# Patient Record
Sex: MALE | Race: WHITE | NOT HISPANIC OR LATINO | Employment: UNEMPLOYED | URBAN - NONMETROPOLITAN AREA
[De-identification: names, ages, dates, MRNs, and addresses within clinical notes are randomized per-mention and may not be internally consistent; named-entity substitution may affect disease eponyms.]

---

## 2017-01-23 ENCOUNTER — HOSPITAL ENCOUNTER (EMERGENCY)
Facility: HOSPITAL | Age: 67
Discharge: HOME | End: 2017-01-23
Attending: EMERGENCY MEDICINE | Admitting: EMERGENCY MEDICINE

## 2017-01-23 VITALS
BODY MASS INDEX: 35.21 KG/M2 | HEIGHT: 72 IN | HEART RATE: 98 BPM | SYSTOLIC BLOOD PRESSURE: 145 MMHG | RESPIRATION RATE: 22 BRPM | WEIGHT: 260 LBS | OXYGEN SATURATION: 94 % | DIASTOLIC BLOOD PRESSURE: 72 MMHG | TEMPERATURE: 98.4 F

## 2017-01-23 DIAGNOSIS — T78.40XA ALLERGIC REACTION, INITIAL ENCOUNTER: Primary | ICD-10-CM

## 2017-01-23 LAB
ALBUMIN SERPL-MCNC: 4.1 G/DL (ref 3.4–4.8)
ALBUMIN/GLOB SERPL: 1.2 G/DL (ref 1.1–1.8)
ALP SERPL-CCNC: 79 U/L (ref 38–126)
ALT SERPL W P-5'-P-CCNC: 108 U/L (ref 21–72)
ANION GAP SERPL CALCULATED.3IONS-SCNC: 16 MMOL/L (ref 5–15)
AST SERPL-CCNC: 70 U/L (ref 17–59)
BASOPHILS # BLD AUTO: 0.02 10*3/MM3 (ref 0–0.2)
BASOPHILS NFR BLD AUTO: 0.2 % (ref 0–2)
BILIRUB SERPL-MCNC: 0.8 MG/DL (ref 0.2–1.3)
BUN BLD-MCNC: 17 MG/DL (ref 7–21)
BUN/CREAT SERPL: 13.7 (ref 7–25)
CALCIUM SPEC-SCNC: 9.2 MG/DL (ref 8.4–10.2)
CHLORIDE SERPL-SCNC: 105 MMOL/L (ref 95–110)
CO2 SERPL-SCNC: 23 MMOL/L (ref 22–31)
CREAT BLD-MCNC: 1.24 MG/DL (ref 0.7–1.3)
DEPRECATED RDW RBC AUTO: 42.8 FL (ref 35.1–43.9)
EOSINOPHIL # BLD AUTO: 0.11 10*3/MM3 (ref 0–0.7)
EOSINOPHIL NFR BLD AUTO: 1.2 % (ref 0–7)
ERYTHROCYTE [DISTWIDTH] IN BLOOD BY AUTOMATED COUNT: 13.1 % (ref 11.5–14.5)
GFR SERPL CREATININE-BSD FRML MDRD: 59 ML/MIN/1.73 (ref 60–113)
GLOBULIN UR ELPH-MCNC: 3.4 GM/DL (ref 2.3–3.5)
GLUCOSE BLD-MCNC: 146 MG/DL (ref 60–100)
HCT VFR BLD AUTO: 45.1 % (ref 39–49)
HGB BLD-MCNC: 15.4 G/DL (ref 13.7–17.3)
IMM GRANULOCYTES # BLD: 0.01 10*3/MM3 (ref 0–0.02)
IMM GRANULOCYTES NFR BLD: 0.1 % (ref 0–0.5)
LYMPHOCYTES # BLD AUTO: 1.11 10*3/MM3 (ref 0.6–4.2)
LYMPHOCYTES NFR BLD AUTO: 12.6 % (ref 10–50)
MCH RBC QN AUTO: 30.6 PG (ref 26.5–34)
MCHC RBC AUTO-ENTMCNC: 34.1 G/DL (ref 31.5–36.3)
MCV RBC AUTO: 89.5 FL (ref 80–98)
MONOCYTES # BLD AUTO: 0.45 10*3/MM3 (ref 0–0.9)
MONOCYTES NFR BLD AUTO: 5.1 % (ref 0–12)
NEUTROPHILS # BLD AUTO: 7.12 10*3/MM3 (ref 2–8.6)
NEUTROPHILS NFR BLD AUTO: 80.8 % (ref 37–80)
PLATELET # BLD AUTO: 205 10*3/MM3 (ref 150–450)
PMV BLD AUTO: 11.3 FL (ref 8–12)
POTASSIUM BLD-SCNC: 3.8 MMOL/L (ref 3.5–5.1)
PROT SERPL-MCNC: 7.5 G/DL (ref 6.3–8.6)
RBC # BLD AUTO: 5.04 10*6/MM3 (ref 4.37–5.74)
SODIUM BLD-SCNC: 144 MMOL/L (ref 137–145)
WBC NRBC COR # BLD: 8.82 10*3/MM3 (ref 3.2–9.8)

## 2017-01-23 PROCEDURE — 25010000002 DIPHENHYDRAMINE PER 50 MG: Performed by: EMERGENCY MEDICINE

## 2017-01-23 PROCEDURE — 80053 COMPREHEN METABOLIC PANEL: CPT | Performed by: EMERGENCY MEDICINE

## 2017-01-23 PROCEDURE — 25010000002 METHYLPREDNISOLONE PER 125 MG: Performed by: EMERGENCY MEDICINE

## 2017-01-23 PROCEDURE — 99283 EMERGENCY DEPT VISIT LOW MDM: CPT

## 2017-01-23 PROCEDURE — 36415 COLL VENOUS BLD VENIPUNCTURE: CPT

## 2017-01-23 PROCEDURE — 96375 TX/PRO/DX INJ NEW DRUG ADDON: CPT

## 2017-01-23 PROCEDURE — 94640 AIRWAY INHALATION TREATMENT: CPT

## 2017-01-23 PROCEDURE — 85025 COMPLETE CBC W/AUTO DIFF WBC: CPT | Performed by: EMERGENCY MEDICINE

## 2017-01-23 PROCEDURE — 96374 THER/PROPH/DIAG INJ IV PUSH: CPT

## 2017-01-23 RX ORDER — AMLODIPINE BESYLATE AND BENAZEPRIL HYDROCHLORIDE 10; 40 MG/1; MG/1
1 CAPSULE ORAL
COMMUNITY
Start: 2017-01-05 | End: 2018-01-06

## 2017-01-23 RX ORDER — ALBUTEROL SULFATE 2.5 MG/3ML
5 SOLUTION RESPIRATORY (INHALATION) ONCE
Status: COMPLETED | OUTPATIENT
Start: 2017-01-23 | End: 2017-01-23

## 2017-01-23 RX ORDER — FAMOTIDINE 20 MG/1
20 TABLET, FILM COATED ORAL 2 TIMES DAILY
Qty: 4 TABLET | Refills: 0 | Status: SHIPPED | OUTPATIENT
Start: 2017-01-23 | End: 2022-09-27

## 2017-01-23 RX ORDER — FAMOTIDINE 10 MG/ML
20 INJECTION, SOLUTION INTRAVENOUS ONCE
Status: COMPLETED | OUTPATIENT
Start: 2017-01-23 | End: 2017-01-23

## 2017-01-23 RX ORDER — HYDROCODONE BITARTRATE AND ACETAMINOPHEN 7.5; 325 MG/1; MG/1
1 TABLET ORAL EVERY 6 HOURS
COMMUNITY
Start: 2017-01-05 | End: 2022-09-27

## 2017-01-23 RX ORDER — SODIUM CHLORIDE 0.9 % (FLUSH) 0.9 %
10 SYRINGE (ML) INJECTION AS NEEDED
Status: DISCONTINUED | OUTPATIENT
Start: 2017-01-23 | End: 2017-01-23 | Stop reason: HOSPADM

## 2017-01-23 RX ORDER — PREDNISONE 20 MG/1
20 TABLET ORAL 3 TIMES DAILY
Qty: 6 TABLET | Refills: 0 | Status: SHIPPED | OUTPATIENT
Start: 2017-01-23 | End: 2022-09-27

## 2017-01-23 RX ORDER — ALLOPURINOL 300 MG/1
300 TABLET ORAL
COMMUNITY
Start: 2017-01-05 | End: 2018-01-06

## 2017-01-23 RX ORDER — METHYLPREDNISOLONE SODIUM SUCCINATE 125 MG/2ML
125 INJECTION, POWDER, LYOPHILIZED, FOR SOLUTION INTRAMUSCULAR; INTRAVENOUS ONCE
Status: COMPLETED | OUTPATIENT
Start: 2017-01-23 | End: 2017-01-23

## 2017-01-23 RX ORDER — COLCHICINE 0.6 MG/1
0.6 TABLET ORAL
COMMUNITY
Start: 2017-01-05

## 2017-01-23 RX ORDER — INDOMETHACIN 75 MG/1
75 CAPSULE, EXTENDED RELEASE ORAL
COMMUNITY
Start: 2017-01-05

## 2017-01-23 RX ORDER — DIPHENHYDRAMINE HCL 25 MG
25 CAPSULE ORAL EVERY 6 HOURS PRN
Qty: 8 CAPSULE | Refills: 0 | Status: SHIPPED | OUTPATIENT
Start: 2017-01-23 | End: 2022-09-27

## 2017-01-23 RX ORDER — DIPHENHYDRAMINE HYDROCHLORIDE 50 MG/ML
50 INJECTION INTRAMUSCULAR; INTRAVENOUS ONCE
Status: COMPLETED | OUTPATIENT
Start: 2017-01-23 | End: 2017-01-23

## 2017-01-23 RX ADMIN — DIPHENHYDRAMINE HYDROCHLORIDE 50 MG: 50 INJECTION INTRAMUSCULAR; INTRAVENOUS at 02:57

## 2017-01-23 RX ADMIN — FAMOTIDINE 20 MG: 10 INJECTION, SOLUTION INTRAVENOUS at 02:57

## 2017-01-23 RX ADMIN — ALBUTEROL SULFATE 5 MG: 2.5 SOLUTION RESPIRATORY (INHALATION) at 03:04

## 2017-01-23 RX ADMIN — METHYLPREDNISOLONE SODIUM SUCCINATE 125 MG: 125 INJECTION, POWDER, FOR SOLUTION INTRAMUSCULAR; INTRAVENOUS at 02:57

## 2017-01-23 NOTE — ED PROVIDER NOTES
Subjective   Patient is a 66 y.o. male presenting with allergic reaction.   History provided by:  Patient  Allergic Reaction   Presenting symptoms: itching (UPPER THIGHS), rash and swelling (UPPER LIP)    Presenting symptoms: no difficulty breathing, no difficulty swallowing and no wheezing    Itching:     Location:  Leg (upper lip swelling)    Severity:  Moderate    Onset quality:  Unable to specify    Duration:  8 hours    Timing:  Constant    Progression:  Improving (He was itching on his thighs this eveng and applied some OTC  antifungal  spray and later around 9 pm started to have some  swelling upper lip. no tongue swellingor trouble breathing. has mild scratching in throat.)  Rash:     Location:  Face and buttocks    Quality: redness      Severity:  Moderate    Onset quality:  Gradual    Duration:  6 hours (first started on the thigh and is having some swelling o fthe lip since 9pm l)    Timing:  Constant    Progression:  Unchanged  Severity:  Moderate  Duration:  1 hour  Prior allergic episodes:  No prior episodes  Context: chemicals (antifungal spray)    Worsened by:  Nothing  Ineffective treatments:  None tried      Review of Systems   Constitutional: Negative for activity change, chills, diaphoresis and fever.   HENT: Negative for congestion, drooling, hearing loss, postnasal drip, sinus pressure and trouble swallowing.    Eyes: Negative for photophobia and visual disturbance.   Respiratory: Negative for wheezing.    Cardiovascular: Negative for chest pain and palpitations.   Gastrointestinal: Negative for abdominal distention, abdominal pain, diarrhea, nausea and vomiting.   Endocrine: Negative for polyuria.   Genitourinary: Negative for flank pain.   Musculoskeletal: Negative for back pain, myalgias and neck stiffness.   Skin: Positive for itching (UPPER THIGHS) and rash.   Allergic/Immunologic: Negative for immunocompromised state.   Neurological: Negative for dizziness, seizures, numbness and  headaches.   Psychiatric/Behavioral: Negative for agitation.       Past Medical History   Diagnosis Date   • Arthritis    • Gout    • Hypertension        No Known Allergies    Past Surgical History   Procedure Laterality Date   • Colonoscopy         History reviewed. No pertinent family history.    Social History     Social History   • Marital status:      Spouse name: N/A   • Number of children: N/A   • Years of education: N/A     Social History Main Topics   • Smoking status: Never Smoker   • Smokeless tobacco: None   • Alcohol use Yes      Comment: rarely   • Drug use: No   • Sexual activity: Defer     Other Topics Concern   • None     Social History Narrative   • None           Objective   Physical Exam   Constitutional: He is oriented to person, place, and time. He appears well-developed and well-nourished. No distress.   HENT:   Head: Normocephalic and atraumatic.       Right Ear: External ear normal.   Left Ear: External ear normal.   Nose: Nose normal.   Mouth/Throat: Oropharynx is clear and moist. No oropharyngeal exudate.   Eyes: Conjunctivae and EOM are normal. Pupils are equal, round, and reactive to light.   Neck: Normal range of motion. Neck supple. No tracheal deviation present.   Cardiovascular: Normal rate, regular rhythm and normal heart sounds.    Pulmonary/Chest: Effort normal and breath sounds normal. No respiratory distress. He has no wheezes.   Abdominal: Soft. Bowel sounds are normal. There is no tenderness.   Musculoskeletal: Normal range of motion.   Neurological: He is alert and oriented to person, place, and time.   Skin: Skin is warm. Rash noted. Rash is urticarial (bilateral lateral upper thighs.). He is not diaphoretic.   Nursing note and vitals reviewed.      Procedures         ED Course  ED Course   Comment By Time   Is feeling better after benadryl/solumedrol/pepcid. No tongue swelling. No shortnessof breath/trouble breathing. I dont think this is angioedema and probably  reaction to the new spray or some other with allergic reaction. Will continue to observe. Igor Julian MD 01/23 8704          His swelling is gone down to close to normal lip. He is not in in any distress, will be discharged , advised to have follow up with pcp . Avoid any know allergen        MDM  Number of Diagnoses or Management Options  Allergic reaction, initial encounter: new and requires workup     Amount and/or Complexity of Data Reviewed  Clinical lab tests: ordered and reviewed  Review and summarize past medical records: yes    Risk of Complications, Morbidity, and/or Mortality  Presenting problems: high  Diagnostic procedures: moderate  Management options: moderate    Patient Progress  Patient progress: improved      Final diagnoses:   Allergic reaction, initial encounter            Igor Julian MD  01/25/17 0241

## 2021-03-05 ENCOUNTER — TRANSCRIBE ORDERS (OUTPATIENT)
Dept: PHYSICAL THERAPY | Facility: HOSPITAL | Age: 71
End: 2021-03-05

## 2021-03-05 ENCOUNTER — HOSPITAL ENCOUNTER (OUTPATIENT)
Dept: PHYSICAL THERAPY | Facility: HOSPITAL | Age: 71
Setting detail: THERAPIES SERIES
Discharge: HOME | End: 2021-03-05

## 2021-03-05 DIAGNOSIS — G89.29 CHRONIC BACK PAIN GREATER THAN 3 MONTHS DURATION: Primary | ICD-10-CM

## 2021-03-05 DIAGNOSIS — M54.9 CHRONIC BACK PAIN GREATER THAN 3 MONTHS DURATION: Primary | ICD-10-CM

## 2021-03-05 PROCEDURE — 97161 PT EVAL LOW COMPLEX 20 MIN: CPT | Performed by: PHYSICAL THERAPIST

## 2021-03-05 PROCEDURE — 97110 THERAPEUTIC EXERCISES: CPT | Performed by: PHYSICAL THERAPIST

## 2021-03-05 NOTE — THERAPY EVALUATION
Outpatient Physical Therapy Ortho Initial Evaluation  [facility]     Patient Name: Wayne Ayala  : 1950  MRN: [id number]  Today's Date: 3/5/2021      Visit Date: 2021  Visit   Return to MD: JOSE  Re-cert date: 3/26/21  There is no problem list on file for this patient.       Past Medical History:   Diagnosis Date   • Arthritis    • Gout    • Hypertension         Past Surgical History:   Procedure Laterality Date   • COLONOSCOPY     • LUMBAR LAMINECTOMY Bilateral        Visit Dx:     ICD-10-CM ICD-9-CM   1. Chronic back pain greater than 3 months duration  M54.9 724.5    G89.29 338.29     Medications (Admitted on 3/5/2021)     colchicine 0.6 MG tablet     diphenhydrAMINE (BENADRYL) 25 mg capsule     famotidine (PEPCID) 20 MG tablet     HYDROcodone-acetaminophen (NORCO) 7.5-325 MG per tablet     indomethacin SR (INDOCIN SR) 75 MG CR capsule     predniSONE (DELTASONE) 20 MG tablet      Allergies: NKA        PT Ortho     Row Name 21 1000       Subjective Comments    Subjective Comments  69 yo male presenting s/p L3-4, L4-5 lumbar lamenectomy with medial facetectomies and naa-laminectomy with R L5-S1 with microresection of epidural cyst on 21. 10-12 yr h/o progressively worsening B foot numbness and burning, no real LBP during that time. 8 day hospital stay in [place], including had skilled PT on an inpatient basis. Had burning in B feet pre-operartively which resolved completely post op. Now with increased L ankle/foot weakness and numbness.    -BS       Precautions and Contraindications    Precautions/Limitations  spinal precautions  -BS       Subjective Pain    Able to rate subjective pain?  yes  -BS    Pre-Treatment Pain Level  0  -BS    Post-Treatment Pain Level  2  -BS       Quarter Clearing    Quarter Clearing  Lower Quarter Clearing  -BS       Sensory Screen for Light Touch- Lower Quarter Clearing    L1 (inguinal area)  Bilateral:;Intact  -BS    L2 (anterior mid  thigh)  Bilateral:;Intact  -BS    L3 (distal anterior thigh)  Bilateral:;Intact  -BS    L4 (medial lower leg/foot)  Bilateral:;Intact  -BS    L5 (lateral lower leg/great toe)  Bilateral:;Diminished L>R  -BS    S1 (bottom of foot)  Bilateral:;Diminished L>R  -BS       Myotomal Screen- Lower Quarter Clearing    Hip flexion (L2)  Bilateral:;5 (Normal)  -BS    Knee extension (L3)  Bilateral:;4+ (Good +)  -BS    Ankle DF (L4)  Right:;5 (Normal);Left:;0 (Absent)  -BS    Great toe extension (L5)  Right:;5 (Normal);Left:;0 (Absent)  -BS    Ankle PF (S1)  Right:;5 (Normal);Left:;2- (Poor -)  -BS    Knee flexion (S2)  Right:;5 (Normal);Left:;4+ (Good +)  -BS       Lumbar ROM Screen- Lower Quarter Clearing    Lumbar Flexion  Normal lumbar flex to ~90°  -BS    Lumbar Extension  Impaired severe limit-unable to extent past neutral  -BS    Lumbar Lateral Flexion  Impaired 50% mod limit bilat  -BS    Lumbar Rotation  Impaired R WFL, L 50% mod limit  -BS       General ROM    GENERAL ROM COMMENTS  B LE's AROM: R hip flex ~100° L hip flex ~90° R knee 0-116° L knee 0-106° R ankle WFL-all planes; L ankle PROM: DF -5° PF 35° inv 11° italia 10°   -BS       MMT (Manual Muscle Testing)    General MMT Comments  R HIP abd 4+/5 L hip abd 3+/5 L ankle-inv 2-/5 italia 0/5   -BS       Gait/Stairs (Locomotion)    Comment (Gait/Stairs)  Nicole ambulating with RW with excessive trunk flexion   -BS      User Key  (r) = Recorded By, (t) = Taken By, (c) = Cosigned By    Initials Name Provider Type    BS Dell Mercedes, PT Physical Therapist                            PT OP Goals     Row Name 03/05/21 1000          PT Short Term Goals    STG Date to Achieve  03/19/21  -BS     STG 1  Pt indepe with HEP for lumbar stabilization and lumbar mobility  -BS     STG 1 Progress  New  -BS     STG 2  Improve L ankle MMT to 2+/5  -BS     STG 2 Progress  New  -BS     STG 3  Improve light touch sensation by 25-50% with B feet  -BS     STG 3 Progress  New  -BS     STG 4  Able  to ambulate fair erect (neutral lumbar ext) with RW on level surfaces  -BS     STG 4 Progress  New  -BS        Long Term Goals    LTG Date to Achieve  04/02/21  -BS     LTG 1  TBD  -BS        Time Calculation    PT Goal Re-Cert Due Date  03/26/21  -BS       User Key  (r) = Recorded By, (t) = Taken By, (c) = Cosigned By    Initials Name Provider Type    BS Dell Mercedes, PT Physical Therapist          PT Assessment/Plan     Row Name 03/05/21 1006          PT Assessment    Functional Limitations  Impaired gait;Performance in work activities;Performance in sport activities;Performance in self-care ADL;Performance in leisure activities  -BS     Impairments  Endurance;Gait;Impaired flexibility;Range of motion;Sensation;Muscle strength;Pain  -BS     Assessment Comments  71 yo male presenting with profound L ankle/foot weakness and L>R foot sensory loss s/p L3-4, L4-5 laminectomy with facetectomies.  -BS     Please refer to paper survey for additional self-reported information  Yes  -BS     Rehab Potential  Fair  -BS     Patient/caregiver participated in establishment of treatment plan and goals  Yes  -BS     Patient would benefit from skilled therapy intervention  Yes  -BS        PT Plan    PT Frequency  2x/week  -BS     Predicted Duration of Therapy Intervention (PT)  3 weeks then TBD  -BS     Planned CPT's?  PT EVAL LOW COMPLEXITY: 51661;PT RE-EVAL: 54109;PT THER PROC EA 15 MIN: 71898;PT MANUAL THERAPY EA 15 MIN: 19805;PT NEUROMUSC RE-EDUCATION EA 15 MIN: 55848;PT GAIT TRAINING EA 15 MIN: 59063;PT HOT OR COLD PACK TREAT MCARE;PT ELECTRICAL STIM UNATTEND: ;PT THER SUPP EA 15 MIN  -BS     Physical Therapy Interventions (Optional Details)  home exercise program;lumbar stabilization;modalities;patient/family education;postural re-education;ROM (Range of Motion);strengthening;stretching  -BS     PT Plan Comments  Gently progress with core stability (HL alt leg lifts, pelvic tilts) and gentle flexion based lumbar  stretches to asses for effect on B foot paresthesia and L foot/ankle strength. May benefit from Russian estim to L pretibials/evertors.   -BS       User Key  (r) = Recorded By, (t) = Taken By, (c) = Cosigned By    Initials Name Provider Type    Dell Rodríguez, PT Physical Therapist          Modalities     Row Name 03/05/21 1000             Ice    Ice Applied  Yes  -BS      Location  low back, seated  -BS      Rx Minutes  10 mins  -BS      Ice S/P Rx  Yes  -BS        User Key  (r) = Recorded By, (t) = Taken By, (c) = Cosigned By    Initials Name Provider Type    Dell Rodríguez, PT Physical Therapist        OP Exercises     Row Name 03/05/21 1000             Precautions    Existing Precautions/Restrictions  spinal s/p lumbar laminectomy L3-4, L4-5 w/   -BS         Subjective Comments    Subjective Comments  69 yo male presenting s/p L3-4, L4-5 lumbar lamenectomy with medial facetectomies and naa-laminectomy with R L5-S1 with microresection of epidural cyst on 2/23/21. 10-12 yr h/o progressively worsening B foot numbness and burning, no real LBP during that time. 8 day hospital stay in [place], including had skilled PT on an inpatient basis. Had burning in B feet pre-operartively which resolved completely post op. Now with increased L ankle/foot weakness and numbness.    -BS         Subjective Pain    Able to rate subjective pain?  yes  -BS      Pre-Treatment Pain Level  0  -BS      Post-Treatment Pain Level  2  -BS         Exercise 1    Exercise Name 1  standing B hip abduction w/ RW  -BS      Sets 1  1  -BS      Reps 1  15  -BS         Exercise 2    Exercise Name 2  standing L HS curls w/ RW  -BS      Sets 2  1  -BS      Reps 2  15  -BS         Exercise 3    Exercise Name 3  SL clam shells  -BS      Sets 3  1  -BS      Reps 3  20  -BS      Additional Comments  L only  -BS         Exercise 4    Exercise Name 4  see modalities  -BS        User Key  (r) = Recorded By, (t) = Taken By, (c) = Cosigned By     Initials Name Provider Type    Dell Rodríguez, PT Physical Therapist                        Outcome Measure Options: Modifed Owestry  Modified Oswestry  Modified Oswestry Score/Comments: 22/50-44%      Time Calculation:     Start Time: 1006  Stop Time: 1106  Time Calculation (min): 60 min  PT Non-Billable Time (min): 10 min  Total Timed Code Minutes- PT: 50 minute(s)     Therapy Charges for Today     Code Description Service Date Service Provider Modifiers Qty    04977093472  PT EVAL LOW COMPLEXITY 2 3/5/2021 Dell Mercedes, PT GP 1    91432294649 HC PT THER PROC EA 15 MIN 3/5/2021 Dell Mercedes, PT GP 1    68473249489 HC PT THER SUPP EA 15 MIN 3/5/2021 Dell Mercedes, PT GP 1          PT SABINA-Codes  Outcome Measure Options: Modifed Owestry  Modified Oswestry Score/Comments: 22/50-44%         Dell Mercedes, PT  3/5/2021

## 2021-03-08 ENCOUNTER — TRANSCRIBE ORDERS (OUTPATIENT)
Dept: PHYSICAL THERAPY | Facility: HOSPITAL | Age: 71
End: 2021-03-08

## 2021-03-08 ENCOUNTER — TRANSCRIBE ORDERS (OUTPATIENT)
Dept: OCCUPATIONAL THERAPY | Facility: HOSPITAL | Age: 71
End: 2021-03-08

## 2021-03-08 DIAGNOSIS — M48.061 DEGENERATIVE LUMBAR SPINAL STENOSIS: Primary | ICD-10-CM

## 2021-03-08 DIAGNOSIS — M48.00 SPINAL STENOSIS, UNSPECIFIED SPINAL REGION: Primary | ICD-10-CM

## 2021-03-11 ENCOUNTER — HOSPITAL ENCOUNTER (OUTPATIENT)
Dept: OCCUPATIONAL THERAPY | Facility: HOSPITAL | Age: 71
Setting detail: THERAPIES SERIES
Discharge: HOME | End: 2021-03-11

## 2021-03-11 ENCOUNTER — HOSPITAL ENCOUNTER (OUTPATIENT)
Dept: PHYSICAL THERAPY | Facility: HOSPITAL | Age: 71
Setting detail: THERAPIES SERIES
Discharge: HOME | End: 2021-03-11

## 2021-03-11 DIAGNOSIS — M48.00 SPINAL STENOSIS, UNSPECIFIED SPINAL REGION: Primary | ICD-10-CM

## 2021-03-11 DIAGNOSIS — G89.29 CHRONIC BACK PAIN GREATER THAN 3 MONTHS DURATION: Primary | ICD-10-CM

## 2021-03-11 DIAGNOSIS — M54.9 CHRONIC BACK PAIN GREATER THAN 3 MONTHS DURATION: Primary | ICD-10-CM

## 2021-03-11 PROCEDURE — 97112 NEUROMUSCULAR REEDUCATION: CPT

## 2021-03-11 PROCEDURE — 97110 THERAPEUTIC EXERCISES: CPT

## 2021-03-11 PROCEDURE — 97165 OT EVAL LOW COMPLEX 30 MIN: CPT

## 2021-03-11 NOTE — THERAPY DISCHARGE NOTE
Outpatient Occupational Therapy Ortho Initial Evaluation/Discharge  [facility]     Patient Name: Wayne Ayala  : 1950  MRN: [id number]  Today's Date: 3/11/2021      Visit Date: 2021     Therapy Diagnosis: Spinal stenosis       There is no problem list on file for this patient.       Past Medical History:   Diagnosis Date   • Arthritis    • Gout    • Hypertension         Past Surgical History:   Procedure Laterality Date   • COLONOSCOPY     • LUMBAR LAMINECTOMY Bilateral          Visit Dx:    ICD-10-CM ICD-9-CM   1. Spinal stenosis, unspecified spinal region  M48.00 724.00       Patient History     Row Name 21 1105             History    Chief Complaint  Numbness;Pain;Muscle weakness;Fatigue/poor endurance;Difficulty Walking  -      Type of Pain  Back pain  -      Date Current Problem(s) Began  21  -      Brief Description of Current Complaint  Pt is a 71 yr old male who presents to OT after back sx at [facility] on 2021. Pt biggest complaint is the numbness in his R foot and decreased LE strength. (Pt is already seeing PT for this.) Pt reports no difficulty with ADLs/IADLs or UE strength.   -      Previous treatment for THIS PROBLEM  Rehabilitation;Surgery  -      Surgery Date:  21  -      Patient/Caregiver Goals  Relieve pain;Return to prior level of function;Improve mobility;Improve strength  -      Current Tobacco Use  no  -      Smoking Status  no  -KH      Patient's Rating of General Health  Very good  -KH      Hand Dominance  right-handed  -KH      Occupation/sports/leisure activities  fish, garage work  -KH      Patient seeing anyone else for problem(s)?  PT  -      Surgery/Hospitalization  2021  -KH         Pain     Pain Location  Back  -KH      Pain at Present  2  -KH      Pain at Best  0  -KH      Pain at Worst  5  -KH      Pain Frequency  Intermittent  -KH      What Performance Factors Make the Current Problem(s) WORSE?   stretching, standing  -KH      What Performance Factors Make the Current Problem(s) BETTER?  rest  -KH      Is your sleep disturbed?  No  -KH      Is medication used to assist with sleep?  No  -KH      Total hours of sleep per night  ~7-8 hours  -KH      What position do you sleep in?  Right sidelying;Left sidelying;Supine  -KH      Difficulties at work?  retired  -KH      Difficulties with ADL's?  none  -KH         Fall Risk Assessment    Any falls in the past year:  No  -KH         Services    Prior Rehab/Home Health Experiences  No  -KH      Are you currently receiving Home Health services  No  -KH      Do you plan to receive Home Health services in the near future  No  -KH         Daily Activities    Primary Language  English  -KH        User Key  (r) = Recorded By, (t) = Taken By, (c) = Cosigned By    Initials Name Provider Type    Annabelle Perea OT Occupational Therapist          OT Ortho     Row Name 03/11/21 1102             Precautions and Contraindications    Precautions/Limitations  lifting restrictions (specify in comments);spinal precautions 8# lifting restrictions  -KH         Subjective Pain    Able to rate subjective pain?  yes  -KH      Pre-Treatment Pain Level  2  -KH      Post-Treatment Pain Level  2  -KH         Sensation    Sensation WNL?  WFL  -KH      Light Touch  No apparent deficits  -KH      Sharp/Dull  No apparent deficits  -KH         General ROM    GENERAL ROM COMMENTS  BUE AROM WFLs  -KH         MMT (Manual Muscle Testing)    General MMT Comments  BUE grossly 5/5; WFLs  -KH        User Key  (r) = Recorded By, (t) = Taken By, (c) = Cosigned By    Initials Name Provider Type    Annabelle Perea OT Occupational Therapist             Hand Therapy (last 24 hours)      Hand Eval     Row Name 03/11/21 1105             Hand  Strength     Strength Affected Side  Bilateral  -KH          Strength Right    Right  Test 1  105  -KH      Right  Test 2  110  -KH      Right   Test 3  105  -KH       Strength Average Right  106.67  -KH          Strength Left    Left  Test 1  110  -KH      Left  Test 2  105  -KH      Left  Test 3  105  -KH       Strength Average Left  106.67  -KH         Pinch Strength    Affected Side  Bilateral  -         Right Hand Strength - Pinch (lbs)    Lateral  26 lbs  -KH      Three Jaw Femi  22 lbs  -KH      Tip Pinch - Index Finger  14 lbs  -KH         Left Hand Strength - Pinch (lbs)    Lateral  26 lbs  -KH      Three Jaw Femi  24 lbs  -KH      Tip Pinch - Index Finger  16 lbs  -KH        User Key  (r) = Recorded By, (t) = Taken By, (c) = Cosigned By    Initials Name Provider Type    Annabelle Perea OT Occupational Therapist                       OT Assessment/Plan     Row Name 03/11/21 1105          OT Assessment    OT Diagnosis  spinal stenosis  -     Patient/caregiver participated in establishment of treatment plan and goals  Yes  -     Patient would benefit from skilled therapy intervention  No  -        OT Plan    OT Frequency  Other (comment) D/C OT  -     Planned CPT's?  OT EVAL LOW COMPLEXITY: 95220  -     OT Plan Comments  No skilled OT services indicated at this time.   -       User Key  (r) = Recorded By, (t) = Taken By, (c) = Cosigned By    Initials Name Provider Type    Annabelle Perea OT Occupational Therapist             Assessment: Pt pleasant and agreeable to OT evaluation. Reporting he feels he does not need OT services. Pt able to demo LB dressing using spinal precautions (figure 4) IND. Reporting no other difficulty with ADLs/IADLs. Has a walk-in shower with bench and has been assisting friends with working on their cars. BUE AROM WFLs; BUE MMT grossly 5/5; no deficits noted with coordination or sensation of BUEs. Feel pt does not need skilled OT services at this time. Will d/c. Recommend continued PT for low back pain and decreased LE strength.       Outcome Measure Options: Quick  DASH  Quick DASH  Open a tight or new jar.: No Difficulty  Do heavy household chores (e.g., wash walls, wash floors): Unable  Carry a shopping bag or briefcase: Unable  Wash your back: No Difficulty  Use a knife to cut food: No Difficulty  Recreational activities in which you take some force or impact through your arm, should or hand (e.g. golf, hammering, tennis, etc.): Unable  During the past week, to what extent has your arm, shoulder, or hand problem interfered with your normal social activites with family, friends, neighbors or groups?: Not at all  During the past week, were you limited in your work or other regular daily activities as a result of your arm, shoulder or hand problem?: Not limited at all  Arm, Shoulder, or hand pain: None  Tingling (pins and needles) in your arm, shoulder, or hand: None  During the past week, how much difficulty have you had sleeping because of the pain in your arm, shoulder or hand?: No difficulty  Number of Questions Answered: 11  Quick DASH Score: 27.27         Time Calculation:   OT Start Time: 1105  OT Stop Time: 1130  OT Time Calculation (min): 25 min     Therapy Charges for Today     Code Description Service Date Service Provider Modifiers Qty    [id number] HC OT EVAL LOW COMPLEXITY 2 3/11/2021 Annabelle North OT GO 1                          Annabelle North OT  3/11/2021

## 2021-03-17 ENCOUNTER — APPOINTMENT (OUTPATIENT)
Dept: OCCUPATIONAL THERAPY | Facility: HOSPITAL | Age: 71
End: 2021-03-17

## 2021-03-17 ENCOUNTER — HOSPITAL ENCOUNTER (OUTPATIENT)
Dept: PHYSICAL THERAPY | Facility: HOSPITAL | Age: 71
Setting detail: THERAPIES SERIES
Discharge: HOME | End: 2021-03-17

## 2021-03-17 DIAGNOSIS — G89.29 CHRONIC BACK PAIN GREATER THAN 3 MONTHS DURATION: Primary | ICD-10-CM

## 2021-03-17 DIAGNOSIS — M54.9 CHRONIC BACK PAIN GREATER THAN 3 MONTHS DURATION: Primary | ICD-10-CM

## 2021-03-17 PROCEDURE — G0283 ELEC STIM OTHER THAN WOUND: HCPCS | Performed by: PHYSICAL THERAPIST

## 2021-03-17 PROCEDURE — 97110 THERAPEUTIC EXERCISES: CPT | Performed by: PHYSICAL THERAPIST

## 2021-03-18 ENCOUNTER — APPOINTMENT (OUTPATIENT)
Dept: OCCUPATIONAL THERAPY | Facility: HOSPITAL | Age: 71
End: 2021-03-18

## 2021-03-18 ENCOUNTER — HOSPITAL ENCOUNTER (OUTPATIENT)
Dept: PHYSICAL THERAPY | Facility: HOSPITAL | Age: 71
Setting detail: THERAPIES SERIES
Discharge: HOME | End: 2021-03-18

## 2021-03-18 DIAGNOSIS — G89.29 CHRONIC BACK PAIN GREATER THAN 3 MONTHS DURATION: Primary | ICD-10-CM

## 2021-03-18 DIAGNOSIS — M54.9 CHRONIC BACK PAIN GREATER THAN 3 MONTHS DURATION: Primary | ICD-10-CM

## 2021-03-18 PROCEDURE — 97110 THERAPEUTIC EXERCISES: CPT | Performed by: PHYSICAL THERAPIST

## 2021-03-18 PROCEDURE — G0283 ELEC STIM OTHER THAN WOUND: HCPCS | Performed by: PHYSICAL THERAPIST

## 2021-03-18 NOTE — THERAPY TREATMENT NOTE
Outpatient Physical Therapy Ortho Treatment Note  [facility]     Patient Name: Wayne Ayala  : 1950  MRN: [id number]  Today's Date: 3/18/2021      Visit Date: 2021  Attendance:   Subjective improvement: n/a  Recert: 3/26/21  MD Appointment: 3/22/21    Visit Dx:    ICD-10-CM ICD-9-CM   1. Chronic back pain greater than 3 months duration  M54.9 724.5    G89.29 338.29       There is no problem list on file for this patient.       Past Medical History:   Diagnosis Date   • Arthritis    • Gout    • Hypertension         Past Surgical History:   Procedure Laterality Date   • COLONOSCOPY     • LUMBAR LAMINECTOMY Bilateral        PT Ortho     Row Name 21       Precautions and Contraindications    Precautions/Limitations  lifting restrictions (specify in comments) 8# lifting restrictions  -BS      User Key  (r) = Recorded By, (t) = Taken By, (c) = Cosigned By    Initials Name Provider Type    Dell Rodríguez, PT Physical Therapist                      PT Assessment/Plan     Row Name 21          PT Assessment    Assessment Comments  Fair partial recruitment of L tib anterior with high current [place] NMES. Poor carryover to function as of now with seated toe raises still needing to be manually assisted to lift L foot/toes from the ground.   -BS        PT Plan    PT Frequency  2x/week  -BS     Predicted Duration of Therapy Intervention (PT)  3 weeks  -BS     PT Plan Comments  continue NMES to L Tib ant and address further hip strengthening.   -BS       User Key  (r) = Recorded By, (t) = Taken By, (c) = Cosigned By    Initials Name Provider Type    Dell Rodríguez, PT Physical Therapist          Modalities     Row Name 21             ELECTRICAL STIMULATION    Attended/Unattended  Unattended  -BS      Stimulation Type  Russian  -BS      Max mAmp  100  -BS      Location/Electrode Placement/Other  L Ant Tib  -BS       PT E-Stim Unattended (Manual) Minutes   10  -BS        User Key  (r) = Recorded By, (t) = Taken By, (c) = Cosigned By    Initials Name Provider Type    BS Dell Mercedes, PT Physical Therapist        OP Exercises     Row Name 03/18/21 0930             Precautions    Existing Precautions/Restrictions  spinal  -BS         Subjective Comments    Subjective Comments  Pt reports no change with L foot in terms of mobility yet  -BS         Subjective Pain    Able to rate subjective pain?  yes  -BS      Pre-Treatment Pain Level  2  -BS      Post-Treatment Pain Level  2  -BS         Exercise 1    Exercise Name 1  PRO II-Seat 10-4.0  -BS      Time 1  10'  -BS         Exercise 2    Exercise Name 2  NMES L Ant Tib  -BS      Time 2  10  -BS      Additional Comments  100 mAmps  -BS         Exercise 3    Exercise Name 3  multihip machine-L hip flex/abd/ext  -BS      Sets 3  1  -BS      Reps 3  20  -BS      Additional Comments  1 plate  -BS         Exercise 4    Exercise Name 4  seated B HR/TR  -BS      Sets 4  1  -BS      Reps 4  20  -BS         Exercise 5    Exercise Name 5  seated TB resisted B hip abd  -BS      Sets 5  1  -BS      Reps 5  20 ea  -BS      Additional Comments  yellow then green TB respectively  -BS        User Key  (r) = Recorded By, (t) = Taken By, (c) = Cosigned By    Initials Name Provider Type    Dell Rodríguez, PT Physical Therapist                       PT OP Goals     Row Name 03/18/21 1000 03/18/21 0930       PT Short Term Goals    STG Date to Achieve  --  03/19/21  -BS    STG 1  --  Pt indepe with HEP for lumbar stabilization and lumbar mobility  -BS    STG 1 Progress  --  Ongoing  -BS    STG 2  --  Improve L ankle MMT to 2+/5  -BS    STG 2 Progress  --  Ongoing  -BS    STG 3  --  Improve light touch sensation by 25-50% with B feet  -BS    STG 3 Progress  --  Ongoing  -BS    STG 4  --  Able to ambulate fair erect (neutral lumbar ext) with RW on level surfaces  -BS    STG 4 Progress  --  Ongoing  -BS       Long Term Goals    LTG Date to  Achieve  --  04/02/21  -BS    LTG 1  --  TBD  -BS       Time Calculation    PT Goal Re-Cert Due Date  03/26/21  -BS  03/26/21  -BS      User Key  (r) = Recorded By, (t) = Taken By, (c) = Cosigned By    Initials Name Provider Type    BS Dell Mercedes, PT Physical Therapist                         Time Calculation:   Start Time: 0930  Stop Time: 1017  Time Calculation (min): 47 min  PT Non-Billable Time (min): 10 min  Total Timed Code Minutes- PT: 37 minute(s)  Therapy Charges for Today     Code Description Service Date Service Provider Modifiers Qty    74632101666  PT THER PROC EA 15 MIN 3/18/2021 Dell Mercedes, PT GP 2    25329268308 HC PT ELECTRICAL STIM UNATTENDED 3/18/2021 Dell Mercedes, PT  1                    Dell Mercedes, PT  3/18/2021

## 2021-03-18 NOTE — THERAPY TREATMENT NOTE
"    Outpatient Physical Therapy Ortho Treatment Note  [facility]     Patient Name: Wayne Ayala  : 1950  MRN: [id number]  Today's Date: 3/18/2021      Visit Date: 2021  Attendance: 3/3  Subjective improvement: n/a  Recert: 3/26/21  MD Appointment: 3/22/21    Visit Dx:    ICD-10-CM ICD-9-CM   1. Chronic back pain greater than 3 months duration  M54.9 724.5    G89.29 338.29       There is no problem list on file for this patient.       Past Medical History:   Diagnosis Date   • Arthritis    • Gout    • Hypertension         Past Surgical History:   Procedure Laterality Date   • COLONOSCOPY     • LUMBAR LAMINECTOMY Bilateral        PT Ortho     Row Name 21 0848       Subjective Comments    Subjective Comments  Pt reports achy low back  -BS       Precautions and Contraindications    Precautions/Limitations  lifting restrictions (specify in comments)  -BS       Subjective Pain    Able to rate subjective pain?  yes  -BS    Post-Treatment Pain Level  3  -BS      User Key  (r) = Recorded By, (t) = Taken By, (c) = Cosigned By    Initials Name Provider Type    BS Dell Mercedes, PT Physical Therapist            21 0848   Precautions   Existing Precautions/Restrictions spinal   Subjective Comments   Subjective Comments Pt reports achy low back   Subjective Pain   Able to rate subjective pain? yes   Pre-Treatment Pain Level 3   Post-Treatment Pain Level 3   Exercise 1   Exercise Name 1 PRO II-Seat 10-4.0   Time 1 10'   Exercise 2   Exercise Name 2 NMES L Ant Tib   Time 2 10   Additional Comments 100mAmps   Exercise 3   Exercise Name 3 SL clam shells   Sets 3 1   Reps 3 20   Additional Comments L only   Exercise 4   Exercise Name 4 B supine piriformis S   Sets 4 3   Time 4 30\"   Exercise 5   Exercise Name 5 standing B HS S   Sets 5 1   Time 5 30\" hold          21 0848   Precautions   Existing Precautions/Restrictions spinal   Subjective Comments   Subjective Comments Pt reports achy " low back   Subjective Pain   Able to rate subjective pain? yes   Pre-Treatment Pain Level 3   Post-Treatment Pain Level 3   ELECTRICAL STIMULATION   Attended/Unattended Unattended   Stimulation Type Russian   Max mAmp 100   Location/Electrode Placement/Other L Ant Tib    PT E-Stim Unattended (Manual) Minutes 10        03/17/21 0848   Precautions   Existing Precautions/Restrictions spinal   Subjective Comments   Subjective Comments Pt reports achy low back   Subjective Pain   Able to rate subjective pain? yes   Pre-Treatment Pain Level 3   Post-Treatment Pain Level 3   ELECTRICAL STIMULATION   Attended/Unattended Unattended   Stimulation Type Russian   Max mAmp 100   Location/Electrode Placement/Other L Ant Tib    PT E-Stim Unattended (Manual) Minutes 10             03/17/21 0848   PT Assessment   Assessment Comments Pt with partial activation of L ant tibialis (pretibials) with high intensity [place] estim positioned in supine position. Will attempt larger electrode pads as smaller pads today did not cover as much surface area of the left anterior tibialis.   PT Plan   PT Frequency 2x/week   Predicted Duration of Therapy Intervention (PT) 3 weeks   PT Plan Comments continue [place] estim to L anterior tibialis with large electrode pads. Strengthen L hip.           03/17/21 0848   Subjective Comments   Subjective Comments Pt reports achy low back   Precautions and Contraindications   Precautions/Limitations lifting restrictions (specify in comments)   Subjective Pain   Able to rate subjective pain? yes   Pre-Treatment Pain Level 3   Post-Treatment Pain Level 3                                               Time Calculation:   Start Time: 0852  Stop Time: 0930  Time Calculation (min): 38 min  PT Non-Billable Time (min): 10 min  Total Timed Code Minutes- PT: 28 minute(s)  Therapy Charges for Today     Code Description Service Date Service Provider Modifiers Qty    04866127613  PT THER PROC EA 15 MIN  3/17/2021 Dell Mercedes, PT GP 2    09007034669 HC PT ELECTRICAL STIM UNATTENDED 3/17/2021 Dell Mercedes, PT  1                    Dell Mercedes, PT  3/18/2021

## 2021-03-23 ENCOUNTER — HOSPITAL ENCOUNTER (OUTPATIENT)
Dept: PHYSICAL THERAPY | Facility: HOSPITAL | Age: 71
Setting detail: THERAPIES SERIES
Discharge: HOME | End: 2021-03-23

## 2021-03-23 DIAGNOSIS — M54.9 CHRONIC BACK PAIN GREATER THAN 3 MONTHS DURATION: Primary | ICD-10-CM

## 2021-03-23 DIAGNOSIS — G89.29 CHRONIC BACK PAIN GREATER THAN 3 MONTHS DURATION: Primary | ICD-10-CM

## 2021-03-23 PROCEDURE — G0283 ELEC STIM OTHER THAN WOUND: HCPCS | Performed by: PHYSICAL THERAPIST

## 2021-03-23 PROCEDURE — 97110 THERAPEUTIC EXERCISES: CPT | Performed by: PHYSICAL THERAPIST

## 2021-03-23 NOTE — THERAPY TREATMENT NOTE
Outpatient Physical Therapy Ortho Treatment Note  [facility]     Patient Name: Wayne Ayala  : 1950  MRN: [id number]  Today's Date: 3/23/2021      Visit Date: 2021  Attendance:   Subjective improvement: unknown  Recert: 3/26/21  MD Appointment: 3/22/21    Visit Dx:    ICD-10-CM ICD-9-CM   1. Chronic back pain greater than 3 months duration  M54.9 724.5    G89.29 338.29       There is no problem list on file for this patient.       Past Medical History:   Diagnosis Date   • Arthritis    • Gout    • Hypertension         Past Surgical History:   Procedure Laterality Date   • COLONOSCOPY     • LUMBAR LAMINECTOMY Bilateral        PT Ortho     Row Name 21 0846       Subjective Comments    Subjective Comments  PT reports wanting to get more feeling back in the L foot, moving his L foot a bit better with NMES. Still using a cane in the home and the   -BS       Precautions and Contraindications    Precautions/Limitations  lifting restrictions (specify in comments) 8# lifting restrictions  -BS       Subjective Pain    Able to rate subjective pain?  yes  -BS    Pre-Treatment Pain Level  2  -BS    Post-Treatment Pain Level  2  -BS      User Key  (r) = Recorded By, (t) = Taken By, (c) = Cosigned By    Initials Name Provider Type    Dell Rodríguez, PT Physical Therapist                      PT Assessment/Plan     Row Name 21 0846          PT Assessment    Assessment Comments  Slight recruitment of L tib anterior and other pretibials with [place] estim directly to L ant tibialis.   -BS        PT Plan    PT Frequency  2x/week  -BS     Predicted Duration of Therapy Intervention (PT)  3 weeks  -BS     PT Plan Comments  address further stimulation of suspected denervated L tib anterior with high volt DC or Argentine. Re-cert next session.  -BS       User Key  (r) = Recorded By, (t) = Taken By, (c) = Cosigned By    Initials Name Provider Type    Dell Rodríguez, PT Physical Therapist           Modalities     Row Name 03/23/21 0846             ELECTRICAL STIMULATION    Attended/Unattended  Unattended  -BS      Stimulation Type  Russian  -BS      Max mAmp  100  -BS      Location/Electrode Placement/Other  L Ant Tib  -BS       PT E-Stim Unattended (Manual) Minutes  10  -BS        User Key  (r) = Recorded By, (t) = Taken By, (c) = Cosigned By    Initials Name Provider Type    BS Dell Mercedes PT Physical Therapist        OP Exercises     Row Name 03/23/21 0846             Precautions    Existing Precautions/Restrictions  spinal  -BS         Subjective Comments    Subjective Comments  PT reports wanting to get more feeling back in the L foot, moving his L foot a bit better with NMES. Still using a cane in the home and the   -BS         Subjective Pain    Able to rate subjective pain?  yes  -BS      Pre-Treatment Pain Level  2  -BS      Post-Treatment Pain Level  2  -BS         Exercise 1    Exercise Name 1  PRO II-Seat 10-4.0  -BS      Time 1  10'  -BS         Exercise 2    Exercise Name 2  NMES L Ant Tib  -BS      Time 2  10  -BS      Additional Comments  100mAmps  -BS         Exercise 3    Exercise Name 3  multihip machine-L hip flex/abd/ext  -BS      Sets 3  1  -BS      Reps 3  20  -BS      Additional Comments  2 plates  -BS         Exercise 4    Exercise Name 4  seated B HR/TR  -BS      Sets 4  1  -BS      Reps 4  20  -BS      Additional Comments  assisted w/ L HR/TR, R HR  -BS         Exercise 5    Exercise Name 5  SL L hip abduction AROM  -BS      Sets 5  1  -BS      Reps 5  10  -BS         Exercise 6    Exercise Name 6  B SLR  -BS      Sets 6  1  -BS      Reps 6  10 ea  -BS        User Key  (r) = Recorded By, (t) = Taken By, (c) = Cosigned By    Initials Name Provider Type    Dell Rodríguez PT Physical Therapist                       PT OP Goals     Row Name 03/23/21 0846 03/23/21 0800       PT Short Term Goals    STG Date to Achieve  03/19/21  -BS  --    STG 1  Pt indepe with HEP for  lumbar stabilization and lumbar mobility  -BS  --    STG 1 Progress  Ongoing  -BS  --    STG 2  Improve L ankle MMT to 2+/5  -BS  --    STG 2 Progress  Ongoing  -BS  --    STG 3  Improve light touch sensation by 25-50% with B feet  -BS  --    STG 3 Progress  Ongoing  -BS  --    STG 4  Able to ambulate fair erect (neutral lumbar ext) with RW on level surfaces  -BS  --    STG 4 Progress  Ongoing  -BS  --       Long Term Goals    LTG Date to Achieve  04/02/21  -BS  --    LTG 1  TBD  -BS  --       Time Calculation    PT Goal Re-Cert Due Date  --  03/26/21  -BS      User Key  (r) = Recorded By, (t) = Taken By, (c) = Cosigned By    Initials Name Provider Type    Dell Rodríguez, PT Physical Therapist                         Time Calculation:   Start Time: 0846  Stop Time: 0931  Time Calculation (min): 45 min  PT Non-Billable Time (min): 10 min  Total Timed Code Minutes- PT: 35 minute(s)  Therapy Charges for Today     Code Description Service Date Service Provider Modifiers Qty    01389219116 HC PT THER PROC EA 15 MIN 3/23/2021 Dell Mercedes, PT GP 2    72367910370 HC PT ELECTRICAL STIM UNATTENDED 3/23/2021 Dell Mercedes, PT  1                    Dell Mercedes, PT  3/23/2021

## 2021-03-26 ENCOUNTER — HOSPITAL ENCOUNTER (OUTPATIENT)
Dept: PHYSICAL THERAPY | Facility: HOSPITAL | Age: 71
Setting detail: THERAPIES SERIES
Discharge: HOME | End: 2021-03-26

## 2021-03-26 DIAGNOSIS — G89.29 CHRONIC BACK PAIN GREATER THAN 3 MONTHS DURATION: Primary | ICD-10-CM

## 2021-03-26 DIAGNOSIS — M54.9 CHRONIC BACK PAIN GREATER THAN 3 MONTHS DURATION: Primary | ICD-10-CM

## 2021-03-26 PROCEDURE — G0283 ELEC STIM OTHER THAN WOUND: HCPCS | Performed by: PHYSICAL THERAPIST

## 2021-03-26 PROCEDURE — 97110 THERAPEUTIC EXERCISES: CPT | Performed by: PHYSICAL THERAPIST

## 2021-03-26 NOTE — THERAPY TREATMENT NOTE
Outpatient Physical Therapy Ortho Treatment Note  [facility]     Patient Name: Wayne Ayala  : 1950  MRN: [id number]  Today's Date: 3/26/2021      Visit Date: 2021  Attendance:   Subjective improvement: unknown  Recert: 21  MD Appointment: TBD    Visit Dx:    ICD-10-CM ICD-9-CM   1. Chronic back pain greater than 3 months duration  M54.9 724.5    G89.29 338.29       There is no problem list on file for this patient.       Past Medical History:   Diagnosis Date   • Arthritis    • Gout    • Hypertension         Past Surgical History:   Procedure Laterality Date   • COLONOSCOPY     • LUMBAR LAMINECTOMY Bilateral        PT Ortho     Row Name 21       Subjective Comments    Subjective Comments  pt reports some mornings his feet feel number than usual which lessens usually as the day progresses. Reports slight improvement in L ankle pretibial strength, very little neuromuscular return of the L>R ankle plantar flexors.  -BS       Precautions and Contraindications    Precautions/Limitations  lifting restrictions (specify in comments) no lifting > 8#  -BS       Subjective Pain    Able to rate subjective pain?  yes  -BS    Pre-Treatment Pain Level  2  -BS    Post-Treatment Pain Level  2  -BS      User Key  (r) = Recorded By, (t) = Taken By, (c) = Cosigned By    Initials Name Provider Type    Dell Rodríguez, PT Physical Therapist                      PT Assessment/Plan     Row Name 21 4338          PT Assessment    Assessment Comments  Slight assist in L ankle dorsiflexion while [place]istan[place] estim is active, needs max amplitude/intensity to get best motor response/contraction of L tib anterior.   -BS        PT Plan    PT Frequency  2x/week  -BS     Predicted Duration of Therapy Intervention (PT)  3 weeks  -BS     PT Plan Comments  Re-cert next session, consider Tristan[place] or NMES to L ankle plantarflexors.   -BS       User Key  (r) = Recorded By, (t) = Taken By, (c) = Cosigned  By    Initials Name Provider Type    Dell Rodríguez, PT Physical Therapist          Modalities     Row Name 03/26/21 0930             ELECTRICAL STIMULATION    Attended/Unattended  Unattended  -BS      Stimulation Type  Russian  -BS      Max mAmp  100  -BS      Location/Electrode Placement/Other  L Ant Tib seated, active B ankle DF simultaneously  -BS       PT E-Stim Unattended (Manual) Minutes  25  -BS        User Key  (r) = Recorded By, (t) = Taken By, (c) = Cosigned By    Initials Name Provider Type    Dell Rodríguez, PT Physical Therapist        OP Exercises     Row Name 03/26/21 0930             Precautions    Existing Precautions/Restrictions  spinal  -BS         Subjective Comments    Subjective Comments  pt reports some mornings his feet feel number than usual which lessens usually as the day progresses. Reports slight improvement in L ankle pretibial strength, very little neuromuscular return of the L>R ankle plantar flexors.  -BS         Subjective Pain    Able to rate subjective pain?  yes  -BS      Pre-Treatment Pain Level  2  -BS      Post-Treatment Pain Level  2  -BS         Exercise 1    Exercise Name 1  PRO II-Seat 10-4.0  -BS         Exercise 2    Exercise Name 2  [place] citizen to L pretibials  -BS      Time 2  25', seated, instructed to actively dorsiflex bilaterally   -BS      Additional Comments  100 mAmps  -BS        User Key  (r) = Recorded By, (t) = Taken By, (c) = Cosigned By    Initials Name Provider Type    Dlel Rodríguez, PT Physical Therapist                       PT OP Goals     Row Name 03/26/21 0930          PT Short Term Goals    STG Date to Achieve  03/19/21  -BS     STG 1  Pt indepe with HEP for lumbar stabilization and lumbar mobility  -BS     STG 1 Progress  Ongoing  -BS     STG 2  Improve L ankle MMT to 2+/5  -BS     STG 2 Progress  Ongoing  -BS     STG 3  Improve light touch sensation by 25-50% with B feet  -BS     STG 3 Progress  Ongoing  -BS     STG 4  Able to ambulate  fair erect (neutral lumbar ext) with RW on level surfaces  -BS     STG 4 Progress  Ongoing  -BS        Long Term Goals    LTG Date to Achieve  04/02/21  -BS     LTG 1  TBD  -BS        Time Calculation    PT Goal Re-Cert Due Date  03/26/21  -BS       User Key  (r) = Recorded By, (t) = Taken By, (c) = Cosigned By    Initials Name Provider Type    BS Dell Mercedes, PT Physical Therapist                         Time Calculation:   Start Time: 0930  Stop Time: 1015  Time Calculation (min): 45 min  PT Non-Billable Time (min): 25 min  Total Timed Code Minutes- PT: 20 minute(s)  Therapy Charges for Today     Code Description Service Date Service Provider Modifiers Qty    54172082418 HC PT THER PROC EA 15 MIN 3/26/2021 Dell Mercedes, PT GP 1    [id number] HC PT ELECTRICAL STIM UNATTENDED 3/26/2021 Dell Mercedes, PT  2                    Dell Mercedes, PT  3/26/2021

## 2021-03-30 ENCOUNTER — HOSPITAL ENCOUNTER (OUTPATIENT)
Dept: PHYSICAL THERAPY | Facility: HOSPITAL | Age: 71
Setting detail: THERAPIES SERIES
Discharge: HOME | End: 2021-03-30

## 2021-03-30 DIAGNOSIS — M54.9 CHRONIC BACK PAIN GREATER THAN 3 MONTHS DURATION: Primary | ICD-10-CM

## 2021-03-30 DIAGNOSIS — G89.29 CHRONIC BACK PAIN GREATER THAN 3 MONTHS DURATION: Primary | ICD-10-CM

## 2021-03-30 PROCEDURE — G0283 ELEC STIM OTHER THAN WOUND: HCPCS | Performed by: PHYSICAL THERAPIST

## 2021-03-30 PROCEDURE — 97110 THERAPEUTIC EXERCISES: CPT | Performed by: PHYSICAL THERAPIST

## 2021-03-30 NOTE — THERAPY PROGRESS REPORT/RE-CERT
Outpatient Physical Therapy Ortho Progress Note  [facility]     Patient Name: Wayne Ayala  : 1950  MRN: [id number]  Today's Date: 3/30/2021      Visit Date: 2021  Attendance:   Subjective improvement: some  Recert: 21  MD Appointment: TBD    Visit Dx:    ICD-10-CM ICD-9-CM   1. Chronic back pain greater than 3 months duration  M54.9 724.5    G89.29 338.29       There is no problem list on file for this patient.       Past Medical History:   Diagnosis Date   • Arthritis    • Gout    • Hypertension         Past Surgical History:   Procedure Laterality Date   • COLONOSCOPY     • LUMBAR LAMINECTOMY Bilateral        PT Ortho     Row Name 21 4797       Subjective Comments    Subjective Comments  pt reports increased sensation in the middle 3 toes of the L foot last night, deep pressure felt as if someone was stepping on his toes. Pt states each day of having PT including estim the L foot feels better overall in that the L foot/ankle feels a bit stronger.   -BS       Precautions and Contraindications    Precautions/Limitations  lifting restrictions (specify in comments) no lifting > 8#  -BS       Subjective Pain    Able to rate subjective pain?  yes  -BS    Pre-Treatment Pain Level  2  -BS    Post-Treatment Pain Level  2  -BS       MMT (Manual Muscle Testing)    General MMT Comments  L ankle-DF 3-/5 inv 2+/5 italia 2-/5 PF 2/5  -BS      User Key  (r) = Recorded By, (t) = Taken By, (c) = Cosigned By    Initials Name Provider Type    Dell Rodríguez, PT Physical Therapist                      PT Assessment/Plan     Row Name 21 3199          PT Assessment    Assessment Comments  Progressing toward L ankle MMT goal, improving with ambulation in that he is now ambulating with only a single point cane despite occasional foot drag with the L LE. Fair recruitment of L gastroc with high intensity Chinese Estim in sitting with concurrent functional heel raises performed.   -BS         PT Plan    PT Frequency  2x/week  -BS     Predicted Duration of Therapy Intervention (PT)  3 weeks  -BS     PT Plan Comments  continue with Georgian estim to L gastroc.   -BS       User Key  (r) = Recorded By, (t) = Taken By, (c) = Cosigned By    Initials Name Provider Type    Dell Rodríguez PT Physical Therapist          Modalities     Row Name 03/30/21 0847             ELECTRICAL STIMULATION    Attended/Unattended  Unattended  -BS      Stimulation Type  Russian  -BS      Max mAmp  100  -BS      Location/Electrode Placement/Other  L gastrocnemius, seated concurrently performing heel raises bilaterally  -BS       PT E-Stim Unattended (Manual) Minutes  10  -BS        User Key  (r) = Recorded By, (t) = Taken By, (c) = Cosigned By    Initials Name Provider Type    Dell Rodrígeuz, PT Physical Therapist        OP Exercises     Row Name 03/30/21 0847             Precautions    Existing Precautions/Restrictions  spinal  -BS         Subjective Comments    Subjective Comments  pt reports increased sensation in the middle 3 toes of the L foot last night, deep pressure felt as if someone was stepping on his toes. Pt states each day of having PT including estim the L foot feels better overall in that the L foot/ankle feels a bit stronger.   -BS         Subjective Pain    Able to rate subjective pain?  yes  -BS      Pre-Treatment Pain Level  2  -BS      Post-Treatment Pain Level  2  -BS         Exercise 1    Exercise Name 1  PRO II-Seat 10-4.0  -BS      Time 1  10'  -BS         Exercise 2    Exercise Name 2  Russian to L gastrocnemius  -BS      Reps 2  10:20 sec (on/off)  -BS      Time 2  10', seated active heel raises  -BS      Additional Comments  100mAmp  -BS         Exercise 3    Exercise Name 3  multihip machine-L hip flex/abd/ext  -BS      Sets 3  1  -BS      Reps 3  20  -BS      Additional Comments  2 plates  -BS         Exercise 4    Exercise Name 4  bridges  -BS      Sets 4  1  -BS      Reps 4  10  -BS          Exercise 5    Exercise Name 5  MMT reassessment  -BS      Time 5  2'  -BS        User Key  (r) = Recorded By, (t) = Taken By, (c) = Cosigned By    Initials Name Provider Type    Dell Rodríguez, PT Physical Therapist                       PT OP Goals     Row Name 03/30/21 0847 03/30/21 0846       PT Short Term Goals    STG Date to Achieve  04/13/21  -BS  --    STG 1  Pt indepe with HEP for lumbar stabilization and lumbar mobility  -BS  --    STG 1 Progress  Ongoing  -BS  --    STG 2  Improve L ankle MMT to 2+/5  -BS  --    STG 2 Progress  Ongoing;Progressing  -BS  --    STG 3  Improve light touch sensation by 25-50% with B feet  -BS  --    STG 3 Progress  Ongoing;Progressing  -BS  --    STG 4  Able to ambulate fair erect (neutral lumbar ext) with RW on level surfaces  -BS  --    STG 4 Progress  Ongoing  -BS  --       Long Term Goals    LTG Date to Achieve  04/27/21  -BS  --    LTG 1  TBD  -BS  --       Time Calculation    PT Goal Re-Cert Due Date  04/20/21  -BS  04/20/21  -BS      User Key  (r) = Recorded By, (t) = Taken By, (c) = Cosigned By    Initials Name Provider Type    Dell Rodríguez PT Physical Therapist                         Time Calculation:   Start Time: 0847  Stop Time: 0936  Time Calculation (min): 49 min  PT Non-Billable Time (min): 10 min  Total Timed Code Minutes- PT: 39 minute(s)  Therapy Charges for Today     Code Description Service Date Service Provider Modifiers Qty    10844357887  PT THER PROC EA 15 MIN 3/30/2021 Dell Mercedes, PT GP 3    [id number]  PT ELECTRICAL STIM UNATTENDED 3/30/2021 Dell Mercedes, PT  1                    Dell Mercedes PT  3/30/2021

## 2021-04-02 ENCOUNTER — HOSPITAL ENCOUNTER (OUTPATIENT)
Dept: PHYSICAL THERAPY | Facility: HOSPITAL | Age: 71
Setting detail: THERAPIES SERIES
Discharge: HOME | End: 2021-04-02

## 2021-04-02 DIAGNOSIS — G89.29 CHRONIC BACK PAIN GREATER THAN 3 MONTHS DURATION: Primary | ICD-10-CM

## 2021-04-02 DIAGNOSIS — M54.9 CHRONIC BACK PAIN GREATER THAN 3 MONTHS DURATION: Primary | ICD-10-CM

## 2021-04-02 PROCEDURE — 97112 NEUROMUSCULAR REEDUCATION: CPT

## 2021-04-02 PROCEDURE — 97110 THERAPEUTIC EXERCISES: CPT

## 2021-04-02 NOTE — THERAPY TREATMENT NOTE
Outpatient Physical Therapy Ortho Treatment Note  [facility]     Patient Name: Wayne Ayala  : 1950  MRN: [id number]  Today's Date: 2021      Visit Date: 2021   Attendance:   Subjective improvement: 5-10%  Recert: 21  MD Appointment: TBD      Visit Dx:    ICD-10-CM ICD-9-CM   1. Chronic back pain greater than 3 months duration  M54.9 724.5    G89.29 338.29       There is no problem list on file for this patient.       Past Medical History:   Diagnosis Date   • Arthritis    • Gout    • Hypertension         Past Surgical History:   Procedure Laterality Date   • COLONOSCOPY     • LUMBAR LAMINECTOMY Bilateral                        PT Assessment/Plan     Row Name 21 09          PT Assessment    Assessment Comments  Pt does present with some minimal AROM abilities. No noticable contraction noted with high intensity NMES.  Pt is progressing slowly at this time.   -EM        PT Plan    PT Frequency  2x/week  -EM     Predicted Duration of Therapy Intervention (PT)  3 weeks  -EM     PT Plan Comments  Cont current POC. Add Ankle Circles CW and CCW. Eventual progress to YTB therex as indicated.  -EM       User Key  (r) = Recorded By, (t) = Taken By, (c) = Cosigned By    Initials Name Provider Type    EM Jairo Mcintyre, PTA Physical Therapy Assistant            OP Exercises     Row Name 21 09             Subjective Comments    Subjective Comments  Pt reports he was up on his feet alot 3 days abo attending a  and c/o increased pain and spasms afterwards.   -EM         Subjective Pain    Able to rate subjective pain?  yes  -EM      Pre-Treatment Pain Level  2  -EM      Post-Treatment Pain Level  2  -EM         Exercise 1    Exercise Name 1  PRO II-Peak-4.0  -EM      Time 1  10'  -EM         Exercise 2    Exercise Name 2  NMES alternation from ant tib to gastroc   with ankle DF and PF  -EM      Reps 2  10:10  -EM      Time 2  10'  -EM      Additional Comments   100mAmp both channels  -EM         Exercise 3    Exercise Name 3  Seated PF/DF  -EM      Sets 3  1  -EM      Reps 3  20  -EM         Exercise 4    Exercise Name 4  Longsitting Ankle IV/EV  -EM      Sets 4  1  -EM      Reps 4  20  -EM        User Key  (r) = Recorded By, (t) = Taken By, (c) = Cosigned By    Initials Name Provider Type    Jairo Henley PTA Physical Therapy Assistant                       PT OP Goals     Row Name 04/02/21 0900          PT Short Term Goals    STG Date to Achieve  04/13/21  -EM     STG 1  Pt indepe with HEP for lumbar stabilization and lumbar mobility  -EM     STG 1 Progress  Ongoing  -EM     STG 2  Improve L ankle MMT to 2+/5  -EM     STG 2 Progress  Ongoing;Progressing  -EM     STG 3  Improve light touch sensation by 25-50% with B feet  -EM     STG 3 Progress  Ongoing;Progressing  -EM     STG 4  Able to ambulate fair erect (neutral lumbar ext) with RW on level surfaces  -EM     STG 4 Progress  Ongoing  -EM        Long Term Goals    LTG Date to Achieve  04/27/21  -EM     LTG 1  TBD  -EM        Time Calculation    PT Goal Re-Cert Due Date  04/20/21  -EM       User Key  (r) = Recorded By, (t) = Taken By, (c) = Cosigned By    Initials Name Provider Type    Jairo Henley PTA Physical Therapy Assistant                         Time Calculation:   Start Time: 0930  Stop Time: 1014  Time Calculation (min): 44 min  Total Timed Code Minutes- PT: 44 minute(s)  Therapy Charges for Today     Code Description Service Date Service Provider Modifiers Qty    24352771450 HC PT THER PROC EA 15 MIN 4/2/2021 Jairo Mcintyre PTA GP 2    02576492368 HC PT NEUROMUSC RE EDUCATION EA 15 MIN 4/2/2021 Jairo Mcintyre PTA GP 1                    Jairo Mcintyre PTA  4/2/2021

## 2021-04-06 ENCOUNTER — HOSPITAL ENCOUNTER (OUTPATIENT)
Dept: PHYSICAL THERAPY | Facility: HOSPITAL | Age: 71
Setting detail: THERAPIES SERIES
Discharge: HOME | End: 2021-04-06

## 2021-04-06 DIAGNOSIS — G89.29 CHRONIC BACK PAIN GREATER THAN 3 MONTHS DURATION: Primary | ICD-10-CM

## 2021-04-06 DIAGNOSIS — M54.9 CHRONIC BACK PAIN GREATER THAN 3 MONTHS DURATION: Primary | ICD-10-CM

## 2021-04-06 PROCEDURE — 97110 THERAPEUTIC EXERCISES: CPT

## 2021-04-06 NOTE — THERAPY TREATMENT NOTE
Outpatient Physical Therapy Ortho Treatment Note  [facility]     Patient Name: Wayne Ayala  : 1950  MRN: [id number]  Today's Date: 2021      Visit Date: 2021   Attendance:   Subjective improvement: 20%  Recert: 21  MD Appointment: TBD      Visit Dx:    ICD-10-CM ICD-9-CM   1. Chronic back pain greater than 3 months duration  M54.9 724.5    G89.29 338.29       There is no problem list on file for this patient.       Past Medical History:   Diagnosis Date   • Arthritis    • Gout    • Hypertension         Past Surgical History:   Procedure Laterality Date   • COLONOSCOPY     • LUMBAR LAMINECTOMY Bilateral        PT Ortho     Row Name 21       Precautions and Contraindications    Precautions/Limitations  lifting restrictions (specify in comments) No Lifting >8#  -EM       Posture/Observations    Posture/Observations Comments  Rash noted B LE  -EM      User Key  (r) = Recorded By, (t) = Taken By, (c) = Cosigned By    Initials Name Provider Type    Jairo Henley PTA Physical Therapy Assistant                      PT Assessment/Plan     Row Name 21          PT Assessment    Assessment Comments  Pt nevaeh tx well. Pt does reports improvements with AROM and slight improvements with sensation at medial L foot. However, pt displays significant ankle weakness, balance deficits, and gt deficits that will require additional PT to improve.   -EM        PT Plan    PT Frequency  2x/week  -EM     Predicted Duration of Therapy Intervention (PT)  3 wks  -EM     PT Plan Comments  D/C NMES due to contraction noted. Cont hip, knee, and ankle strengtening and balance activities. Ankle AROM measurements next tx.   -EM       User Key  (r) = Recorded By, (t) = Taken By, (c) = Cosigned By    Initials Name Provider Type    Jairo Henley PTA Physical Therapy Assistant          Modalities     Row Name 21 09             ELECTRICAL STIMULATION    Attended/Unattended   "Unattended  -EM      Stimulation Type  Russian 10:10  -EM      Max mAmp  100  -EM      Location/Electrode Placement/Other  L ant Tib 10'  -EM        User Key  (r) = Recorded By, (t) = Taken By, (c) = Cosigned By    Initials Name Provider Type    EM Jairo Mcintyre PTA Physical Therapy Assistant        OP Exercises     Row Name 04/06/21 0900             Subjective Comments    Subjective Comments  Pt states he has a little more feeling at medial L foot  -EM         Subjective Pain    Able to rate subjective pain?  yes  -EM      Pre-Treatment Pain Level  2  -EM      Post-Treatment Pain Level  3  -EM         Exercise 1    Exercise Name 1  PRO II-Peak-4.0  -EM      Time 1  10'  -EM         Exercise 2    Exercise Name 2  B Fwd/Lat Step Up-6\"  -EM      Sets 2  1  -EM      Reps 2  20  -EM         Exercise 3    Exercise Name 3  NMES with active Supine DF  -EM      Sets 3  10:10  -EM      Reps 3  100mAmps  -EM      Time 3  10'  -EM      Additional Comments  No Contraction noted with NMES  -EM         Exercise 4    Exercise Name 4  Seated CR/TR  -EM      Sets 4  1  -EM      Reps 4  30  -EM         Exercise 5    Exercise Name 5  Longsitting Ankle Circles: CW, CCW  -EM      Sets 5  1  -EM      Reps 5  30  -EM         Exercise 6    Exercise Name 6  Ankle ABC  -EM      Sets 6  1  -EM      Reps 6  A-Z  -EM         Exercise 7    Exercise Name 7  Ankle IV, EV  -EM      Sets 7  1  -EM      Reps 7  20  -EM         Exercise 8    Exercise Name 8  Modified Tandem Stance: R LE fwd  -EM      Sets 8  1  -EM      Reps 8  5  -EM      Additional Comments  13\", 21\", 28\", 30\",22\"  -EM        User Key  (r) = Recorded By, (t) = Taken By, (c) = Cosigned By    Initials Name Provider Type    EM Jairo Mcintyre PTA Physical Therapy Assistant                       PT OP Goals     Row Name 04/06/21 0900          PT Short Term Goals    STG Date to Achieve  04/13/21  -EM     STG 1  Pt indepe with HEP for lumbar stabilization and lumbar mobility  -EM     " STG 1 Progress  Ongoing  -EM     STG 2  Improve L ankle MMT to 2+/5  -EM     STG 2 Progress  Ongoing;Progressing  -EM     STG 3  Improve light touch sensation by 25-50% with B feet  -EM     STG 3 Progress  Ongoing;Progressing  -EM     STG 4  Able to ambulate fair erect (neutral lumbar ext) with RW on level surfaces  -EM     STG 4 Progress  Ongoing  -EM        Long Term Goals    LTG Date to Achieve  04/27/21  -EM     LTG 1  TBD  -EM        Time Calculation    PT Goal Re-Cert Due Date  04/20/21  -EM       User Key  (r) = Recorded By, (t) = Taken By, (c) = Cosigned By    Initials Name Provider Type    EM Jairo Mcintyre, JASWANT Physical Therapy Assistant          Therapy Education  Education Details: Updated HEP Issued: Calf S with strap, Ankle 4 Way AROM, ankle Circles CW, CCW, Seated CR/TR, Ankle ABC.  Given: HEP  Program: Progressed  How Provided: Verbal, Demonstration, Written  Provided to: Patient  Level of Understanding: Verbalized, Demonstrated              Time Calculation:   Start Time: 0848  Stop Time: 0950  Time Calculation (min): 62 min  Total Timed Code Minutes- PT: 62 minute(s)  Therapy Charges for Today     Code Description Service Date Service Provider Modifiers Qty    34744906826 HC PT THER PROC EA 15 MIN 4/6/2021 Jairo Mcintyre PTA GP 4                    Jairo Mcintyre PTA  4/6/2021

## 2021-04-09 ENCOUNTER — HOSPITAL ENCOUNTER (OUTPATIENT)
Dept: PHYSICAL THERAPY | Facility: HOSPITAL | Age: 71
Setting detail: THERAPIES SERIES
Discharge: HOME | End: 2021-04-09

## 2021-04-09 DIAGNOSIS — G89.29 CHRONIC BACK PAIN GREATER THAN 3 MONTHS DURATION: Primary | ICD-10-CM

## 2021-04-09 DIAGNOSIS — M54.9 CHRONIC BACK PAIN GREATER THAN 3 MONTHS DURATION: Primary | ICD-10-CM

## 2021-04-09 PROCEDURE — 97110 THERAPEUTIC EXERCISES: CPT

## 2021-04-09 NOTE — THERAPY TREATMENT NOTE
Outpatient Physical Therapy Ortho Treatment Note  [facility]     Patient Name: Wayne Ayala  : 1950  MRN: [id number]  Today's Date: 2021      Visit Date: 2021   Attendance: 10/10  Subjective improvement: 20%  Recert: 21  MD Appointment: TBD      Visit Dx:    ICD-10-CM ICD-9-CM   1. Chronic back pain greater than 3 months duration  M54.9 724.5    G89.29 338.29       There is no problem list on file for this patient.       Past Medical History:   Diagnosis Date   • Arthritis    • Gout    • Hypertension         Past Surgical History:   Procedure Laterality Date   • COLONOSCOPY     • LUMBAR LAMINECTOMY Bilateral        PT Ortho     Row Name 21 0800       Precautions and Contraindications    Precautions/Limitations  lifting restrictions (specify in comments) >8#  -EM       Subjective Pain    Post-Treatment Pain Level  2  -EM       Posture/Observations    Posture/Observations Comments  Rash noted B LE-improving  -EM       Left Lower Ext    Lt Ankle Dorsiflexion AROM  -11  -EM    Lt Ankle Plantarflexion AROM  34  -EM    Lt Ankle Inversion AROM  18  -EM    Lt Ankle Eversion AROM  4  -EM      User Key  (r) = Recorded By, (t) = Taken By, (c) = Cosigned By    Initials Name Provider Type    Jairo Henley PTA Physical Therapy Assistant                      PT Assessment/Plan     Row Name 21 0900          PT Assessment    Assessment Comments  Pt nevaeh tx well, pt is  progressing slowly with PT. Pt cont to have hip/knee/ankle weakness and balance deficits.  B knees flexed during stance and gt. Pt has a recent onset of edema B LE L>R LE-pe edu to notify MD office of this.   -EM        PT Plan    PT Frequency  2x/week  -EM     Predicted Duration of Therapy Intervention (PT)  3 wks  -EM     PT Plan Comments  Ankle isometrics into ball  -EM       User Key  (r) = Recorded By, (t) = Taken By, (c) = Cosigned By    Initials Name Provider Type    Jairo Henley PTA Physical Therapy  "Assistant            OP Exercises     Row Name 04/09/21 0800             Subjective Comments    Subjective Comments  Pt states he is aching like usual. Pt states his pain increases throughout the day. Pt states by the end of the day his B LEs  foot swells up. L>RLE. Pt states the edema began over the past 1-2 wks-Pt edu to [facility]. Pt states he is seeing very minimal improvement.  -EM         Subjective Pain    Able to rate subjective pain?  yes  -EM      Pre-Treatment Pain Level  2  -EM      Post-Treatment Pain Level  2  -EM         Exercise 1    Exercise Name 1  PRO TF-Rphwzgp-6.0  -EM      Time 1  10'  -EM         Exercise 2    Exercise Name 2  Incline Calf S  -EM      Sets 2  3  -EM      Time 2  30\"  -EM         Exercise 3    Exercise Name 3   B St Ham S  -EM      Sets 3  3  -EM      Time 3  30\"  -EM         Exercise 4    Exercise Name 4  Fwd/Lat Step Ups: 6\"  -EM      Sets 4  1  -EM      Reps 4  20  -EM         Exercise 5    Exercise Name 5  MS  -EM      Sets 5  1  -EM      Reps 5  20  -EM         Exercise 6    Exercise Name 6  Modified Tandem Stance: R LE fwd  -EM      Sets 6  1  -EM      Reps 6  5  -EM      Additional Comments  8\", 25\", 7\", 28\", 32\"  -EM         Exercise 7    Exercise Name 7  Seated CR/TR  -EM      Sets 7  1  -EM      Reps 7  20  -EM         Exercise 8    Exercise Name 8  Ankle Circles: CW, CCW  -EM      Sets 8  1  -EM      Reps 8  20  -EM        User Key  (r) = Recorded By, (t) = Taken By, (c) = Cosigned By    Initials Name Provider Type    EM Jairo Mcintyre, PTA Physical Therapy Assistant                       PT OP Goals     Row Name 04/09/21 0900          PT Short Term Goals    STG Date to Achieve  04/13/21  -EM     STG 1  Pt indepe with HEP for lumbar stabilization and lumbar mobility  -EM     STG 1 Progress  Ongoing  -EM     STG 2  Improve L ankle MMT to 2+/5  -EM     STG 2 Progress  Ongoing;Progressing  -EM     STG 3  Improve light touch sensation by 25-50% with B feet  -EM     STG " 3 Progress  Ongoing;Progressing  -EM     STG 4  Able to ambulate fair erect (neutral lumbar ext) with RW on level surfaces  -EM     STG 4 Progress  Ongoing  -EM        Long Term Goals    LTG Date to Achieve  04/27/21  -EM     LTG 1  TBD  -EM        Time Calculation    PT Goal Re-Cert Due Date  04/20/21  -EM       User Key  (r) = Recorded By, (t) = Taken By, (c) = Cosigned By    Initials Name Provider Type    EM Jairo Mcintyre PTA Physical Therapy Assistant                         Time Calculation:   Start Time: 0848  Stop Time: 0941  Time Calculation (min): 53 min  Total Timed Code Minutes- PT: 53 minute(s)  Therapy Charges for Today     Code Description Service Date Service Provider Modifiers Qty    48172812263 HC PT THER PROC EA 15 MIN 4/9/2021 Jairo Mcintyre PTA GP 4                    Jairo Mcintyre PTA  4/9/2021

## 2021-04-13 ENCOUNTER — HOSPITAL ENCOUNTER (OUTPATIENT)
Dept: PHYSICAL THERAPY | Facility: HOSPITAL | Age: 71
Setting detail: THERAPIES SERIES
Discharge: HOME | End: 2021-04-13

## 2021-04-13 DIAGNOSIS — G89.29 CHRONIC BACK PAIN GREATER THAN 3 MONTHS DURATION: Primary | ICD-10-CM

## 2021-04-13 DIAGNOSIS — M54.9 CHRONIC BACK PAIN GREATER THAN 3 MONTHS DURATION: Primary | ICD-10-CM

## 2021-04-13 PROCEDURE — 97110 THERAPEUTIC EXERCISES: CPT

## 2021-04-13 NOTE — THERAPY TREATMENT NOTE
Outpatient Physical Therapy Ortho Treatment Note  [facility]     Patient Name: Wayne Ayala  : 1950  MRN: [id number]  Today's Date: 2021      Visit Date: 2021   Attendance:   Subjective improvement: 20%  Recert: 21  MD Appointment: TBD      Visit Dx:    ICD-10-CM ICD-9-CM   1. Chronic back pain greater than 3 months duration  M54.9 724.5    G89.29 338.29       There is no problem list on file for this patient.       Past Medical History:   Diagnosis Date   • Arthritis    • Gout    • Hypertension         Past Surgical History:   Procedure Laterality Date   • COLONOSCOPY     • LUMBAR LAMINECTOMY Bilateral        PT Ortho     Row Name 21       Precautions and Contraindications    Precautions/Limitations  lifting restrictions (specify in comments) No Lifting >8#  -EM       Subjective Pain    Post-Treatment Pain Level  2  -EM       Posture/Observations    Posture/Observations Comments  Pt amb with SC with B knees flexed, decreased lumbar loridosis, and decreased heelstrike  -EM      User Key  (r) = Recorded By, (t) = Taken By, (c) = Cosigned By    Initials Name Provider Type    EM Jairo Mcintyre, PTA Physical Therapy Assistant                      PT Assessment/Plan     Row Name 21          PT Assessment    Assessment Comments  Pt nevaeh tx well. Pt has B LE weakness L>R. Pt has intermittent sensation changes from numbness to tingling/burning. Pt is progressing slowly at this time.   -EM        PT Plan    PT Frequency  2x/week  -EM     Predicted Duration of Therapy Intervention (PT)  3 wks  -EM     PT Plan Comments  AROM measurements next tx.  -EM       User Key  (r) = Recorded By, (t) = Taken By, (c) = Cosigned By    Initials Name Provider Type    EM Jairo Mcintyre, PTA Physical Therapy Assistant            OP Exercises     Row Name 21 08             Precautions    Existing Precautions/Restrictions  fall  -EM         Subjective Comments     "Subjective Comments  Pt states he gets intermittent burning/tingling in B LE  that lasts for ~10-15\".  -EM         Subjective Pain    Able to rate subjective pain?  yes  -EM      Pre-Treatment Pain Level  2  -EM      Post-Treatment Pain Level  2  -EM         Exercise 1    Exercise Name 1  PRO II-Sprint-4.0  -EM      Time 1  10'  -EM         Exercise 2    Exercise Name 2  Incline Calf S  -EM      Sets 2  3  -EM      Time 2  30\"  -EM         Exercise 3    Exercise Name 3   B St Ham S  -EM      Sets 3  3  -EM      Time 3  30\"  -EM         Exercise 4    Exercise Name 4  Fwd/Lat Step Ups: 6\"  -EM      Sets 4  1  -EM      Reps 4  20  -EM         Exercise 5    Exercise Name 5  MS  -EM      Sets 5  1  -EM      Reps 5  20  -EM         Exercise 6    Exercise Name 6  St Marches  -EM      Sets 6  1  -EM      Reps 6  20  -EM         Exercise 7    Exercise Name 7  Seated CR/TR  -EM      Sets 7  1  -EM      Reps 7  20  -EM         Exercise 8    Exercise Name 8  Ankle Circles: CW, CCW  -EM      Sets 8  1  -EM      Reps 8  20  -EM        User Key  (r) = Recorded By, (t) = Taken By, (c) = Cosigned By    Initials Name Provider Type    EM Jairo Mcintyre, PTA Physical Therapy Assistant                       PT OP Goals     Row Name 04/13/21 0800          PT Short Term Goals    STG Date to Achieve  04/13/21  -EM     STG 1  Pt indepe with HEP for lumbar stabilization and lumbar mobility  -EM     STG 1 Progress  Ongoing  -EM     STG 2  Improve L ankle MMT to 2+/5  -EM     STG 2 Progress  Ongoing;Progressing  -EM     STG 3  Improve light touch sensation by 25-50% with B feet  -EM     STG 3 Progress  Ongoing;Progressing  -EM     STG 4  Able to ambulate fair erect (neutral lumbar ext) with RW on level surfaces  -EM     STG 4 Progress  Ongoing  -EM        Long Term Goals    LTG Date to Achieve  04/27/21  -EM     LTG 1  TBD  -EM        Time Calculation    PT Goal Re-Cert Due Date  04/20/21  -EM       User Key  (r) = Recorded By, (t) = Taken By, " (c) = Cosigned By    Initials Name Provider Type    EM Jairo Mcintyre, JASWANT Physical Therapy Assistant                         Time Calculation:   Start Time: 0848  Stop Time: 0930  Time Calculation (min): 42 min  Total Timed Code Minutes- PT: 42 minute(s)  Therapy Charges for Today     Code Description Service Date Service Provider Modifiers Qty    62539103652 HC PT THER PROC EA 15 MIN 4/13/2021 Jairo Mcintyre PTA GP 3                    Jairo Mcintyre PTA  4/13/2021

## 2021-04-15 ENCOUNTER — HOSPITAL ENCOUNTER (OUTPATIENT)
Dept: PHYSICAL THERAPY | Facility: HOSPITAL | Age: 71
Setting detail: THERAPIES SERIES
Discharge: HOME | End: 2021-04-15

## 2021-04-15 DIAGNOSIS — G89.29 CHRONIC BACK PAIN GREATER THAN 3 MONTHS DURATION: Primary | ICD-10-CM

## 2021-04-15 DIAGNOSIS — M54.9 CHRONIC BACK PAIN GREATER THAN 3 MONTHS DURATION: Primary | ICD-10-CM

## 2021-04-15 PROCEDURE — 97110 THERAPEUTIC EXERCISES: CPT

## 2021-04-15 NOTE — THERAPY TREATMENT NOTE
"    Outpatient Physical Therapy Ortho Treatment Note  [facility]     Patient Name: Wayne Ayala  : 1950  MRN: [id number]  Today's Date: 4/15/2021      Visit Date: 04/15/2021  Attendance:   Subjective improvement:20%  Recert: 21  MD Appointment: TBD    Visit Dx:    ICD-10-CM ICD-9-CM   1. Chronic back pain greater than 3 months duration  M54.9 724.5    G89.29 338.29       There is no problem list on file for this patient.       Past Medical History:   Diagnosis Date   • Arthritis    • Gout    • Hypertension         Past Surgical History:   Procedure Laterality Date   • COLONOSCOPY     • LUMBAR LAMINECTOMY Bilateral        PT Ortho     Row Name 04/15/21 0900       Precautions and Contraindications    Precautions/Limitations  lifting restrictions (specify in comments) no lifting >8#  -EM       Subjective Pain    Post-Treatment Pain Level  2  -EM       Posture/Observations    Posture/Observations Comments  Pt amb with SC with B knees flexed, decreased lumbar loridosis, and decreased heelstrike  -EM      User Key  (r) = Recorded By, (t) = Taken By, (c) = Cosigned By    Initials Name Provider Type    EM Jairo Mcintyre, PTA Physical Therapy Assistant                      PT Assessment/Plan     Row Name 04/15/21 0900          PT Assessment    Assessment Comments  Pt unable to perform st CR ad TR due to signficant weakness, however, pt is able to perform in seated position.   -EM        PT Plan    PT Frequency  2x/week  -EM     Predicted Duration of Therapy Intervention (PT)  3 wks  -EM     PT Plan Comments  Attempt tandem stance next tx possibly with 1 digit touch  -EM       User Key  (r) = Recorded By, (t) = Taken By, (c) = Cosigned By    Initials Name Provider Type    EM Jairo Mcintyre PTA Physical Therapy Assistant            OP Exercises     Row Name 04/15/21 0900             Subjective Comments    Subjective Comments  Pt reports no new changes since last tx. \"Im always better in the " "mornings\"  -EM         Subjective Pain    Able to rate subjective pain?  yes  -EM      Pre-Treatment Pain Level  2  -EM      Post-Treatment Pain Level  2  -EM         Exercise 1    Exercise Name 1  PRO II-Peak-5.0  -EM      Time 1  10'  -EM         Exercise 2    Exercise Name 2  Incline Calf S  -EM      Sets 2  3  -EM      Time 2  30\"  -EM         Exercise 3    Exercise Name 3   B St Ham S  -EM      Sets 3  3  -EM      Time 3  30\"  -EM         Exercise 4    Exercise Name 4  Modified Tandem Stance  -EM      Sets 4  1  -EM      Reps 4  5  -EM      Additional Comments  20\", 65\", 38\", 25\", 41\"  -EM         Exercise 5    Exercise Name 5  Fwd/Lat Step Up-6\"  -EM      Sets 5  1  -EM      Reps 5  20  -EM         Exercise 6    Exercise Name 6  St Marches on Airex  -EM      Sets 6  1  -EM      Reps 6  20  -EM         Exercise 7    Exercise Name 7  Seated CR/TR  -EM      Sets 7  1  -EM      Reps 7  20  -EM        User Key  (r) = Recorded By, (t) = Taken By, (c) = Cosigned By    Initials Name Provider Type    EM Jairo Mcintyre PTA Physical Therapy Assistant                                          Time Calculation:   Start Time: 0845  Stop Time: 0928  Time Calculation (min): 43 min  Total Timed Code Minutes- PT: 43 minute(s)  Therapy Charges for Today     Code Description Service Date Service Provider Modifiers Qty    [id number]  PT THER PROC EA 15 MIN 4/15/2021 Jairo Mcintyre PTA GP 3                    Jairo Mcintyre PTA  4/15/2021     "

## 2021-04-19 ENCOUNTER — HOSPITAL ENCOUNTER (OUTPATIENT)
Dept: PHYSICAL THERAPY | Facility: HOSPITAL | Age: 71
Setting detail: THERAPIES SERIES
Discharge: HOME | End: 2021-04-19

## 2021-04-19 DIAGNOSIS — M54.9 CHRONIC BACK PAIN GREATER THAN 3 MONTHS DURATION: Primary | ICD-10-CM

## 2021-04-19 DIAGNOSIS — G89.29 CHRONIC BACK PAIN GREATER THAN 3 MONTHS DURATION: Primary | ICD-10-CM

## 2021-04-19 PROCEDURE — 97110 THERAPEUTIC EXERCISES: CPT

## 2021-04-20 ENCOUNTER — APPOINTMENT (OUTPATIENT)
Dept: PHYSICAL THERAPY | Facility: HOSPITAL | Age: 71
End: 2021-04-20

## 2021-04-22 ENCOUNTER — HOSPITAL ENCOUNTER (OUTPATIENT)
Dept: PHYSICAL THERAPY | Facility: HOSPITAL | Age: 71
Setting detail: THERAPIES SERIES
Discharge: HOME | End: 2021-04-22

## 2021-04-22 DIAGNOSIS — G89.29 CHRONIC BACK PAIN GREATER THAN 3 MONTHS DURATION: Primary | ICD-10-CM

## 2021-04-22 DIAGNOSIS — M54.9 CHRONIC BACK PAIN GREATER THAN 3 MONTHS DURATION: Primary | ICD-10-CM

## 2021-04-22 PROCEDURE — 97110 THERAPEUTIC EXERCISES: CPT | Performed by: PHYSICAL THERAPIST

## 2021-04-22 NOTE — THERAPY PROGRESS REPORT/RE-CERT
Outpatient Physical Therapy Ortho Progress Note  [facility]     Patient Name: Wayne Ayala  : 1950  MRN: [id number]  Today's Date: 2021      Visit Date: 2021  Subjective Improvement: 20%  Attendance:   (medicare)  Next MD Visit : TBD  Recert Date:  21        Therapy Diagnosis:  Back Pain w/ neuropathy and balance issues;   Visit Dx:    ICD-10-CM ICD-9-CM   1. Chronic back pain greater than 3 months duration  M54.9 724.5    G89.29 338.29       There is no problem list on file for this patient.       Past Medical History:   Diagnosis Date   • Arthritis    • Gout    • Hypertension         Past Surgical History:   Procedure Laterality Date   • COLONOSCOPY     • LUMBAR LAMINECTOMY Bilateral        PT Ortho     Row Name 21 0846       Subjective Comments    Subjective Comments  Overall, slow but steady improvement. Still not able to do heel raises. Walking is better with the cane outside and without an AD inside his home.   -BS       Precautions and Contraindications    Precautions/Limitations  lifting restrictions (specify in comments) no lifting > 8#  -BS       Subjective Pain    Able to rate subjective pain?  yes  -BS    Pre-Treatment Pain Level  2  -BS    Post-Treatment Pain Level  2  -BS       Sensory Screen for Light Touch- Lower Quarter Clearing    L1 (inguinal area)  Bilateral:;Intact  -BS    L2 (anterior mid thigh)  Bilateral:;Intact  -BS    L3 (distal anterior thigh)  Bilateral:;Intact  -BS    L4 (medial lower leg/foot)  Bilateral:;Diminished  -BS    L5 (lateral lower leg/great toe)  Bilateral:;Intact  -BS    S1 (bottom of foot)  Diminished L>R   -BS       Myotomal Screen- Lower Quarter Clearing    Hip flexion (L2)  Bilateral:;5 (Normal)  -BS    Knee extension (L3)  Bilateral:;5 (Normal)  -BS    Ankle DF (L4)  Left:;3- (Fair -)  -BS    Great toe extension (L5)  Left:;0 (Absent)  -BS    Ankle PF (S1)  Left:;2- (Poor -)  -BS    Knee flexion (S2)  Right:;4+ (Good  +);Left:;4 (Good)  -BS       MMT (Manual Muscle Testing)    General MMT Comments  L ankle: DF 3-/5 PF 2-/5 italia 2/5 inv 2/5   -BS      User Key  (r) = Recorded By, (t) = Taken By, (c) = Cosigned By    Initials Name Provider Type    Dell Rodríguez, PT Physical Therapist                      PT Assessment/Plan     Row Name 04/22/21 0846          PT Assessment    Assessment Comments  Slow but steady progress with L ankle strength gains. No goals met as of yet.  Has self report of slight improvement in sensation along the plantar surface of  the L>R foot, improved L ankle DF>PF at this point.  Balance limited by neuropathy, sensory loss in each foot, L>R hip and L>R ankle weakness. Will attempt another round of Russian estim the L gastroc/soleus complex as well as L pretibials to maximize neuromusular return and improve gait mechanics with or without an AD.    -BS        PT Plan    PT Frequency  2x/week  -BS     Predicted Duration of Therapy Intervention (PT)  additional 3-4 weeks  -BS     PT Plan Comments  continue with [place] estim to L pretibials/plantarflexors and address standing balance and hip strengthening.   -BS       User Key  (r) = Recorded By, (t) = Taken By, (c) = Cosigned By    Initials Name Provider Type    Dell Rodríguez PT Physical Therapist            OP Exercises     Row Name 04/22/21 0828             Precautions    Existing Precautions/Restrictions  fall  -BS         Subjective Comments    Subjective Comments  Overall, slow but steady improvement. Still not able to do heel raises. Walking is better with the cane outside and without an AD inside his home.   -BS         Subjective Pain    Able to rate subjective pain?  yes  -BS      Pre-Treatment Pain Level  2  -BS      Post-Treatment Pain Level  2  -BS         Exercise 1    Exercise Name 1  Pro ll LE strength   -BS      Time 1  10 mins  -BS         Exercise 2    Exercise Name 2  MMT reassessment  -BS      Time 2  3'  -BS         Exercise 3     Exercise Name 3  tandem standing, full  -BS      Reps 3  R LE leading-3 sec avg  -BS      Time 3  LLE leading-3 sec, 9 sec, 8 sec  -BS         Exercise 4    Exercise Name 4  Cybex hip add/abd  -BS      Sets 4  1  -BS      Reps 4  20  -BS      Additional Comments  70# add/50# abd  -BS         Exercise 5    Exercise Name 5  cybex leg press: 2L  -BS      Sets 5  1  -BS      Reps 5  20  -BS      Additional Comments  90#  -BS         Exercise 6    Exercise Name 6  cybex LP: L LE only  -BS      Sets 6  1  -BS      Reps 6  20  -BS      Additional Comments  70#  -BS         Exercise 7    Exercise Name 7  seated assisted L heel raises  -BS      Sets 7  3  -BS      Reps 7  10  -BS      Additional Comments  done bilaterally, actively with R only  -BS         Exercise 8    Exercise Name 8  prone L knee flex AROM  -BS      Sets 8  1  -BS      Reps 8  20  -BS        User Key  (r) = Recorded By, (t) = Taken By, (c) = Cosigned By    Initials Name Provider Type    Dell Rodríguez, PT Physical Therapist                       PT OP Goals     Row Name 04/22/21 0846          PT Short Term Goals    STG Date to Achieve  05/06/21  -BS     STG 1  Pt indepe with HEP for lumbar stabilization and lumbar mobility  -BS     STG 1 Progress  Ongoing  -BS     STG 2  Improve L ankle DF MMT to 3/5  -BS     STG 2 Progress  Ongoing;Progressing  -BS     STG 3  Improve light touch sensation by 25-50% with B feet  -BS     STG 3 Progress  Partially Met met with L foot only  -BS     STG 4  Able to ambulate fair erect (neutral lumbar ext) with RW on level surfaces  -BS     STG 4 Progress  Ongoing;Progressing  -BS     STG 5  Improve L ankle PF MMT to 2+/5 (perform seated heel raise)  -BS     STG 5 Progress  Goal Revised;Ongoing  -BS     STG 6  Improve L ankle inv/italia MMT to 3/5  -BS     STG 6 Progress  Goal Revised;Ongoing  -BS        Long Term Goals    LTG Date to Achieve  05/20/21  -BS     LTG 1  Improve L hip ext/L knee flex MMT to 5/5  -BS     LTG 1  Progress  New  -BS        Time Calculation    PT Goal Re-Cert Due Date  05/13/21  -BS       User Key  (r) = Recorded By, (t) = Taken By, (c) = Cosigned By    Initials Name Provider Type    Dell Rodríguez, PT Physical Therapist                         Time Calculation:   Start Time: 0846  Stop Time: 0932  Time Calculation (min): 46 min  Total Timed Code Minutes- PT: 46 minute(s)  Time Calculation- PT  Start Time: 0846  Stop Time: 0932  Time Calculation (min): 46 min  Total Timed Code Minutes- PT: 46 minute(s)  PT Goal Re-Cert Due Date: 05/13/21  Therapy Charges for Today     Code Description Service Date Service Provider Modifiers Qty    96646979874 HC PT THER PROC EA 15 MIN 4/22/2021 Dell Mercedes, PT GP 3                    Dell Mercedes, PT  4/22/2021

## 2021-04-26 ENCOUNTER — HOSPITAL ENCOUNTER (OUTPATIENT)
Dept: PHYSICAL THERAPY | Facility: HOSPITAL | Age: 71
Setting detail: THERAPIES SERIES
Discharge: HOME | End: 2021-04-26

## 2021-04-26 DIAGNOSIS — G89.29 CHRONIC BACK PAIN GREATER THAN 3 MONTHS DURATION: Primary | ICD-10-CM

## 2021-04-26 DIAGNOSIS — M54.9 CHRONIC BACK PAIN GREATER THAN 3 MONTHS DURATION: Primary | ICD-10-CM

## 2021-04-26 PROCEDURE — 97110 THERAPEUTIC EXERCISES: CPT

## 2021-04-26 PROCEDURE — 97112 NEUROMUSCULAR REEDUCATION: CPT

## 2021-04-26 NOTE — THERAPY TREATMENT NOTE
Outpatient Physical Therapy Ortho Treatment Note  [facility]     Patient Name: Wayne Ayala  : 1950  MRN: [id number]  Today's Date: 2021      Visit Date: 2021   Attendance: 15/15  Subjective improvement: 40%  Recert: 21  MD Appointment: 5/3/21      Visit Dx:    ICD-10-CM ICD-9-CM   1. Chronic back pain greater than 3 months duration  M54.9 724.5    G89.29 338.29       There is no problem list on file for this patient.       Past Medical History:   Diagnosis Date   • Arthritis    • Gout    • Hypertension         Past Surgical History:   Procedure Laterality Date   • COLONOSCOPY     • LUMBAR LAMINECTOMY Bilateral        PT Ortho     Row Name 21 0900       Precautions and Contraindications    Precautions/Limitations  lifting restrictions (specify in comments) >8#  -EM       Posture/Observations    Posture/Observations Comments  Pt amb with SC with B knees flexed, decreased lumbar loridosis, and decreased heelstrike  -EM      User Key  (r) = Recorded By, (t) = Taken By, (c) = Cosigned By    Initials Name Provider Type    Jairo Henley PTA Physical Therapy Assistant                                                 Therapy Education  Education Details: Addition to HEP Issued: DKTC, Hsm S, HLTR, SL Cl[place]jackelyn, SL Hip Abd/Add, St March, SLR, [facility], Bridges,   Given: HEP  Program: Progressed  How Provided: Verbal, Demonstration, Written  Provided to: Patient  Level of Understanding: Verbalized, Demonstrated              Time Calculation:   Start Time: 930  Stop Time: 1034  Time Calculation (min): 64 min  Total Timed Code Minutes- PT: 64 minute(s)  Time Calculation- PT  Start Time: 930  Stop Time: 1034  Time Calculation (min): 64 min  Total Timed Code Minutes- PT: 64 minute(s)  PT Goal Re-Cert Due Date: 21          Addendum to charges: x3 [phone]                                        [phone]      Jairo Mcintyre PTA  2021

## 2021-04-28 ENCOUNTER — HOSPITAL ENCOUNTER (OUTPATIENT)
Dept: PHYSICAL THERAPY | Facility: HOSPITAL | Age: 71
Setting detail: THERAPIES SERIES
Discharge: HOME | End: 2021-04-28

## 2021-04-28 DIAGNOSIS — G89.29 CHRONIC BACK PAIN GREATER THAN 3 MONTHS DURATION: Primary | ICD-10-CM

## 2021-04-28 DIAGNOSIS — M54.9 CHRONIC BACK PAIN GREATER THAN 3 MONTHS DURATION: Primary | ICD-10-CM

## 2021-04-28 PROCEDURE — 97112 NEUROMUSCULAR REEDUCATION: CPT

## 2021-04-28 PROCEDURE — 97110 THERAPEUTIC EXERCISES: CPT

## 2021-04-28 NOTE — THERAPY TREATMENT NOTE
Outpatient Physical Therapy Ortho Treatment Note  [facility]     Patient Name: Wayne Ayala  : 1950  MRN: [id number]  Today's Date: 2021      Visit Date: 2021   Attendance:   Subjective improvement: 40%  Recert: 21  MD Appointment: 5/3/21      Visit Dx:    ICD-10-CM ICD-9-CM   1. Chronic back pain greater than 3 months duration  M54.9 724.5    G89.29 338.29       There is no problem list on file for this patient.       Past Medical History:   Diagnosis Date   • Arthritis    • Gout    • Hypertension         Past Surgical History:   Procedure Laterality Date   • COLONOSCOPY     • LUMBAR LAMINECTOMY Bilateral        PT Ortho     Row Name 21 1000       Precautions and Contraindications    Precautions/Limitations  lifting restrictions (specify in comments)  -EM    Precautions  >8#  -EM       Posture/Observations    Posture/Observations Comments  Pt amb with SC with B knees flexed, decreased lumbar loridosis, and decreased heelstrike slight trendelenburg gt pattern. Pt lacks knee flexionduring gt due to knee pain from gout.  -EM      User Key  (r) = Recorded By, (t) = Taken By, (c) = Cosigned By    Initials Name Provider Type    EM Jairo Mcintyre, PTA Physical Therapy Assistant                      PT Assessment/Plan     Row Name 21 1000          PT Assessment    Assessment Comments  Pt nevaeh tx well with increased reps and addition of new therex. Pt cont to have poor muscle contraction of ant tib with NMES, however, AROM is noted.   -EM        PT Plan    PT Frequency  2x/week  -EM     Predicted Duration of Therapy Intervention (PT)  3-4 wks  -EM     PT Plan Comments  Hip and knee MMT next tx.  -EM       User Key  (r) = Recorded By, (t) = Taken By, (c) = Cosigned By    Initials Name Provider Type    EM Jairo Mcintyre, PTA Physical Therapy Assistant          Modalities     Row Name 21 1000             Subjective Pain    Post-Treatment Pain Level  2  -EM       "  User Key  (r) = Recorded By, (t) = Taken By, (c) = Cosigned By    Initials Name Provider Type    EM Jairo Mcintyre, JASWANT Physical Therapy Assistant        OP Exercises     Row Name 04/28/21 1000             Subjective Comments    Subjective Comments  Pt reports no new changes since last tx.  -EM         Subjective Pain    Able to rate subjective pain?  yes  -EM      Pre-Treatment Pain Level  2  -EM      Post-Treatment Pain Level  2  -EM         Exercise 1    Exercise Name 1  PRO JG-Rnjnmup-5.0  -EM      Time 1  10'  -EM         Exercise 2    Exercise Name 2  NMES Ant tib with DF  -EM      Time 2  10'  -EM      Additional Comments  Poor contraction noted  -EM         Exercise 3    Exercise Name 3  Cybex Hip Abd  -EM      Sets 3  1  -EM      Reps 3  30  -EM      Additional Comments  50#  -EM         Exercise 4    Exercise Name 4  Cybex Hip Add  -EM      Sets 4  1  -EM      Reps 4  30  -EM      Additional Comments  75#   -EM         Exercise 5    Exercise Name 5  Cybex LP  -EM      Sets 5  1  -EM      Reps 5  30  -EM      Additional Comments  100#  -EM         Exercise 6    Exercise Name 6  B 3 Way Multi-Hip  -EM      Sets 6  1  -EM      Reps 6  30  -EM      Time 6  .  -EM      Additional Comments  1 Plate  -EM         Exercise 7    Exercise Name 7  Bridges  -EM      Sets 7  1  -EM      Reps 7  30  -EM         Exercise 8    Exercise Name 8  B Sup SLR  -EM      Sets 8  1  -EM      Reps 8  30  -EM         Exercise 9    Exercise Name 9  Fwd/Lat Step Up-6\"  -EM      Sets 9  1  -EM      Reps 9  20  -EM      Time 9  30  -EM         Exercise 10    Exercise Name 10  St B Hip Hikes  -EM      Sets 10  1  -EM      Reps 10  30  -EM        User Key  (r) = Recorded By, (t) = Taken By, (c) = Cosigned By    Initials Name Provider Type    EM Jairo Mcintyre, JASWANT Physical Therapy Assistant                       PT OP Goals     Row Name 04/28/21 1000          PT Short Term Goals    STG Date to Achieve  05/06/21  -EM     STG 1  Pt indepe " with HEP for lumbar stabilization and lumbar mobility  -EM     STG 1 Progress  Ongoing  -EM     STG 2  Improve L ankle DF MMT to 3/5  -EM     STG 2 Progress  Ongoing;Progressing  -EM     STG 3  Improve light touch sensation by 25-50% with B feet  -EM     STG 3 Progress  Partially Met met with L foot only  -EM     STG 4  Able to ambulate fair erect (neutral lumbar ext) with RW on level surfaces  -EM     STG 4 Progress  Ongoing;Progressing  -EM     STG 5  Improve L ankle PF MMT to 2+/5 (perform seated heel raise)  -EM     STG 5 Progress  Ongoing  -EM     STG 6  Improve L ankle inv/italia MMT to 3/5  -EM     STG 6 Progress  Ongoing  -EM        Long Term Goals    LTG Date to Achieve  05/20/21  -EM     LTG 1  Improve L hip ext/L knee flex MMT to 5/5  -EM     LTG 1 Progress  Not Met  -EM        Time Calculation    PT Goal Re-Cert Due Date  05/13/21  -EM       User Key  (r) = Recorded By, (t) = Taken By, (c) = Cosigned By    Initials Name Provider Type    EM Jairo Mcintyre PTA Physical Therapy Assistant                         Time Calculation:   Start Time: 0932  Stop Time: 1038  Time Calculation (min): 66 min  Total Timed Code Minutes- PT: 66 minute(s)  Time Calculation- PT  Start Time: 0932  Stop Time: 1038  Time Calculation (min): 66 min  Total Timed Code Minutes- PT: 66 minute(s)  PT Goal Re-Cert Due Date: 05/13/21  Therapy Charges for Today     Code Description Service Date Service Provider Modifiers Qty    63187021074 HC PT THER PROC EA 15 MIN 4/28/2021 Jairo Mcintyre PTA GP 3    45463228446 HC PT NEUROMUSC RE EDUCATION EA 15 MIN 4/28/2021 Jairo Mcintyre PTA GP 1                    Jairo Mcintyre PTA  4/28/2021

## 2021-05-03 ENCOUNTER — HOSPITAL ENCOUNTER (OUTPATIENT)
Dept: PHYSICAL THERAPY | Facility: HOSPITAL | Age: 71
Setting detail: THERAPIES SERIES
Discharge: HOME | End: 2021-05-03

## 2021-05-03 DIAGNOSIS — G89.29 CHRONIC BACK PAIN GREATER THAN 3 MONTHS DURATION: Primary | ICD-10-CM

## 2021-05-03 DIAGNOSIS — M54.9 CHRONIC BACK PAIN GREATER THAN 3 MONTHS DURATION: Primary | ICD-10-CM

## 2021-05-03 PROCEDURE — 97112 NEUROMUSCULAR REEDUCATION: CPT

## 2021-05-03 PROCEDURE — 97110 THERAPEUTIC EXERCISES: CPT

## 2021-05-03 NOTE — THERAPY TREATMENT NOTE
Outpatient Physical Therapy Ortho Treatment Note  [facility]     Patient Name: Wayne Ayala  : 1950  MRN: [id number]  Today's Date: 5/3/2021      Visit Date: 2021   Attendance:   Subjective improvement: 40%  Recert:21  MD Appointment: 5/3/21      Visit Dx:    ICD-10-CM ICD-9-CM   1. Chronic back pain greater than 3 months duration  M54.9 724.5    G89.29 338.29       There is no problem list on file for this patient.       Past Medical History:   Diagnosis Date   • Arthritis    • Gout    • Hypertension         Past Surgical History:   Procedure Laterality Date   • COLONOSCOPY     • LUMBAR LAMINECTOMY Bilateral        PT Ortho     Row Name 21 0900       Precautions and Contraindications    Precautions/Limitations  lifting restrictions (specify in comments)  -EM    Precautions  >8#  -EM       Posture/Observations    Posture/Observations Comments  Pt amb with SC with B knees flexed, decreased lumbar loridosis, and decreased heelstrike slight trendelenburg gt pattern. Pt lacks knee flexion during gt due to knee pain from gout. FF posture.  -EM       MMT Left Lower Ext    Lt Hip Flexion MMT, Gross Movement  (4+/5) good plus  -EM    Lt Hip ABduction MMT, Gross Movement  (4+/5) good plus  -EM    Lt Hip ADduction MMT, Gross Movement  (5/5) normal  -EM    Lt Knee Extension MMT, Gross Movement  (5/5) normal  -EM    Lt Knee Flexion MMT, Gross Movement  (4/5) good  -EM      User Key  (r) = Recorded By, (t) = Taken By, (c) = Cosigned By    Initials Name Provider Type    EM Jairo Mcintyre, PTA Physical Therapy Assistant                      PT Assessment/Plan     Row Name 21 1000          PT Assessment    Assessment Comments  Pt nevaeh tx well with no pain increase post tx. Pt   -EM        PT Plan    PT Frequency  2x/week  -EM     Predicted Duration of Therapy Intervention (PT)  3-4 wks  -EM     PT Plan Comments  Cont hip/knee/core strengthening.  -EM       User Key  (r) = Recorded  "By, (t) = Taken By, (c) = Cosigned By    Initials Name Provider Type    Jairo Henley, JASWANT Physical Therapy Assistant          Modalities     Row Name 05/03/21 0900             ELECTRICAL STIMULATION    Attended/Unattended  Unattended  -EM      Stimulation Type  Russian  -EM      Max mAmp  100  -EM      Location/Electrode Placement/Other  L gastroc with PF  -EM        User Key  (r) = Recorded By, (t) = Taken By, (c) = Cosigned By    Initials Name Provider Type    Jairo Henley PTA Physical Therapy Assistant        OP Exercises     Row Name 05/03/21 0900             Subjective Comments    Subjective Comments  Pt states he was walking in the garden and states he could really tell his   -EM         Subjective Pain    Able to rate subjective pain?  yes  -EM      Pre-Treatment Pain Level  2  -EM      Post-Treatment Pain Level  2  -EM         Exercise 1    Exercise Name 1  PRO XP-Sneskgf-9.0  -EM      Time 1  10'  -EM         Exercise 2    Exercise Name 2  Incline Calf S  -EM      Sets 2  3  -EM      Time 2  30\"  -EM         Exercise 3    Exercise Name 3  St Ham S  -EM      Sets 3  3  -EM      Time 3  30\"  -EM         Exercise 4    Exercise Name 4  NMES to gastroc with PF  -EM      Time 4  10'  -EM         Exercise 5    Exercise Name 5  Cybex LP  -EM      Sets 5  1  -EM      Reps 5  30  -EM      Additional Comments  110#   -EM         Exercise 6    Exercise Name 6  Cybex Hip Abd  -EM      Sets 6  1  -EM      Reps 6  30  -EM      Additional Comments  55#   -EM        User Key  (r) = Recorded By, (t) = Taken By, (c) = Cosigned By    Initials Name Provider Type    Jairo Henley PTA Physical Therapy Assistant                       PT OP Goals     Row Name 05/03/21 0900          PT Short Term Goals    STG Date to Achieve  05/06/21  -EM     STG 1  Pt indepe with HEP for lumbar stabilization and lumbar mobility  -EM     STG 1 Progress  Ongoing  -EM     STG 2  Improve L ankle DF MMT to 3/5  -EM     STG 2 Progress  " Ongoing;Progressing  -EM     STG 3  Improve light touch sensation by 25-50% with B feet  -EM     STG 3 Progress  Partially Met met with L foot only  -EM     STG 4  Able to ambulate fair erect (neutral lumbar ext) with RW on level surfaces  -EM     STG 4 Progress  Ongoing;Progressing  -EM     STG 5  Improve L ankle PF MMT to 2+/5 (perform seated heel raise)  -EM     STG 5 Progress  Ongoing  -EM     STG 6  Improve L ankle inv/italia MMT to 3/5  -EM     STG 6 Progress  Ongoing  -EM        Long Term Goals    LTG Date to Achieve  05/20/21  -EM     LTG 1  Improve L hip ext/L knee flex MMT to 5/5  -EM     LTG 1 Progress  Partially Met  -EM        Time Calculation    PT Goal Re-Cert Due Date  05/13/21  -EM       User Key  (r) = Recorded By, (t) = Taken By, (c) = Cosigned By    Initials Name Provider Type    EM Jairo Mcintyre PTA Physical Therapy Assistant                         Time Calculation:   Start Time: 0930  Stop Time: 1015  Time Calculation (min): 45 min  Total Timed Code Minutes- PT: 45 minute(s)  Time Calculation- PT  Start Time: 0930  Stop Time: 1015  Time Calculation (min): 45 min  Total Timed Code Minutes- PT: 45 minute(s)  PT Goal Re-Cert Due Date: 05/13/21  Therapy Charges for Today     Code Description Service Date Service Provider Modifiers Qty    14609928677 HC PT THER PROC EA 15 MIN 5/3/2021 Jairo Mcintyre, JASWANT GP 2    04982340101 HC PT NEUROMUSC RE EDUCATION EA 15 MIN 5/3/2021 Jairo Mcintyre PTA GP 1                    Jairo Mcintyre PTA  5/3/2021

## 2021-05-05 ENCOUNTER — HOSPITAL ENCOUNTER (OUTPATIENT)
Dept: PHYSICAL THERAPY | Facility: HOSPITAL | Age: 71
Setting detail: THERAPIES SERIES
Discharge: HOME | End: 2021-05-05

## 2021-05-05 DIAGNOSIS — G89.29 CHRONIC BACK PAIN GREATER THAN 3 MONTHS DURATION: Primary | ICD-10-CM

## 2021-05-05 DIAGNOSIS — M54.9 CHRONIC BACK PAIN GREATER THAN 3 MONTHS DURATION: Primary | ICD-10-CM

## 2021-05-05 PROCEDURE — 97110 THERAPEUTIC EXERCISES: CPT

## 2021-05-05 PROCEDURE — 97112 NEUROMUSCULAR REEDUCATION: CPT

## 2021-05-05 NOTE — THERAPY TREATMENT NOTE
Outpatient Physical Therapy Ortho Treatment Note  [facility]     Patient Name: Wayne Ayala  : 1950  MRN: [id number]  Today's Date: 2021      Visit Date: 2021   Attendance:   Subjective improvement: 40%  Recert:21  MD Appointment: 5/3/21      Visit Dx:    ICD-10-CM ICD-9-CM   1. Chronic back pain greater than 3 months duration  M54.9 724.5    G89.29 338.29       There is no problem list on file for this patient.       Past Medical History:   Diagnosis Date   • Arthritis    • Gout    • Hypertension         Past Surgical History:   Procedure Laterality Date   • COLONOSCOPY     • LUMBAR LAMINECTOMY Bilateral                        PT Assessment/Plan     Row Name 21 1100          PT Assessment    Assessment Comments  Pt envaeh tx well pt cont to have balance and gt deficits and relies on SC for gt. Pt lack of DF and PF along with hip and LE weakness hender him with hobbies, gt safety, and ADLs.   -EM        PT Plan    PT Frequency  2x/week  -EM     Predicted Duration of Therapy Intervention (PT)  3-4 wks  -EM     PT Plan Comments  Cont to work on strength improvements for improved   -EM       User Key  (r) = Recorded By, (t) = Taken By, (c) = Cosigned By    Initials Name Provider Type    EM Jairo Mcintyre, PTA Physical Therapy Assistant            OP Exercises     Row Name 21 1000             Subjective Comments    Subjective Comments  Pt states the surgeon discharged him. Pt states he is stiff and sore today with no known reason why.  -EM         Subjective Pain    Able to rate subjective pain?  yes  -EM      Pre-Treatment Pain Level  2  -EM         Exercise 1    Exercise Name 1  PRO QM-Lgmoekf-4.0  -EM      Time 1  10'  -EM         Exercise 2    Exercise Name 2  Cybex LP  -EM      Sets 2  1  -EM      Reps 2  30  -EM      Additional Comments  110#  -EM         Exercise 3    Exercise Name 3  Cybex Hip Abd  -EM      Sets 3  1  -EM      Reps 3  30  -EM      Additional  Comments  60#   -EM         Exercise 4    Exercise Name 4  Cybex Hip Add  -EM      Sets 4  1  -EM      Reps 4  30  -EM      Additional Comments  70#  -EM         Exercise 5    Exercise Name 5  NMES everters  -EM      Time 5  10'  -EM         Exercise 6    Exercise Name 6  B St Hip Hikes  -EM      Sets 6  1  -EM      Reps 6  30  -EM         Exercise 7    Exercise Name 7  3 Way Multi-Hip  -EM      Sets 7  1  -EM      Reps 7  30  -EM        User Key  (r) = Recorded By, (t) = Taken By, (c) = Cosigned By    Initials Name Provider Type    EM Jairo Mcintyre PTA Physical Therapy Assistant                                          Time Calculation:   Start Time: 1016  Stop Time: 1109  Time Calculation (min): 53 min  Total Timed Code Minutes- PT: 53 minute(s)  Time Calculation- PT  Start Time: 1016  Stop Time: 1109  Time Calculation (min): 53 min  Total Timed Code Minutes- PT: 53 minute(s)  Therapy Charges for Today     Code Description Service Date Service Provider Modifiers Qty    60925326132 HC PT THER PROC EA 15 MIN 5/5/2021 Jairo Mcintyre PTA GP 3    66750404392 HC PT NEUROMUSC RE EDUCATION EA 15 MIN 5/5/2021 Jairo Mcintyre PTA GP 1                    Jairo Mcintyre PTA  5/5/2021

## 2021-05-10 ENCOUNTER — HOSPITAL ENCOUNTER (OUTPATIENT)
Dept: PHYSICAL THERAPY | Facility: HOSPITAL | Age: 71
Setting detail: THERAPIES SERIES
Discharge: HOME | End: 2021-05-10

## 2021-05-10 DIAGNOSIS — G89.29 CHRONIC BACK PAIN GREATER THAN 3 MONTHS DURATION: Primary | ICD-10-CM

## 2021-05-10 DIAGNOSIS — M54.9 CHRONIC BACK PAIN GREATER THAN 3 MONTHS DURATION: Primary | ICD-10-CM

## 2021-05-10 PROCEDURE — 97110 THERAPEUTIC EXERCISES: CPT

## 2021-05-10 PROCEDURE — 97112 NEUROMUSCULAR REEDUCATION: CPT

## 2021-05-10 NOTE — THERAPY TREATMENT NOTE
Outpatient Physical Therapy Ortho Treatment Note  [facility]     Patient Name: Wayne Ayala  : 1950  MRN: [id number]  Today's Date: 5/10/2021      Visit Date: 05/10/2021   Attendance:   Subjective improvement: 50-55%  Recert: 21  MD Appointment: TBD      Visit Dx:    ICD-10-CM ICD-9-CM   1. Chronic back pain greater than 3 months duration  M54.9 724.5    G89.29 338.29       There is no problem list on file for this patient.       Past Medical History:   Diagnosis Date   • Arthritis    • Gout    • Hypertension         Past Surgical History:   Procedure Laterality Date   • COLONOSCOPY     • LUMBAR LAMINECTOMY Bilateral        PT Ortho     Row Name 05/10/21 1000       Subjective Pain    Post-Treatment Pain Level  2  -EM       Posture/Observations    Posture/Observations Comments  Pt amb with SC with B knees slightly flexed, decreased lumbar loridosis, and decreased heelstrike slight trendelenburg gt pattern. Pt lacks knee flexion during gt due to knee pain from gout. FF posture.  -EM      User Key  (r) = Recorded By, (t) = Taken By, (c) = Cosigned By    Initials Name Provider Type    Jairo Henley, JASWANT Physical Therapy Assistant                      PT Assessment/Plan     Row Name 05/10/21 1000          PT Assessment    Assessment Comments  Pt nevaeh tx well, slow progression noted in terms of dorsiflexors, everters, and PF muscle strength. Pt nevaeh progressed resistance well with hip strengthening therex. No goals met this tx. Pt reports a 50-55% improvement.   -EM        PT Plan    PT Frequency  2x/week  -EM     Predicted Duration of Therapy Intervention (PT)  3-4 wks  -EM     PT Plan Comments  Cont Hip and ankle strengthening. Stretches as HEP.  -EM       User Key  (r) = Recorded By, (t) = Taken By, (c) = Cosigned By    Initials Name Provider Type    Jairo Henley PTA Physical Therapy Assistant          Modalities     Row Name 05/10/21 1000             ELECTRICAL STIMULATION     "Attended/Unattended  Unattended  -EM      Stimulation Type  Russian  -EM      Max mAmp  100  -EM      Location/Electrode Placement/Other  L gastroc with PF  -EM        User Key  (r) = Recorded By, (t) = Taken By, (c) = Cosigned By    Initials Name Provider Type    EM Jairo Mcintyre, PTA Physical Therapy Assistant        OP Exercises     Row Name 05/10/21 1000             Subjective Comments    Subjective Comments  Pt states he is feeling \"yucky \" today due to forgetting to take his medication lastnight.  -EM         Subjective Pain    Able to rate subjective pain?  yes  -EM      Pre-Treatment Pain Level  2  -EM      Post-Treatment Pain Level  2  -EM         Exercise 1    Exercise Name 1  PRO NU-Frcymtg-8.0  -EM      Time 1  10'  -EM         Exercise 2    Exercise Name 2  St Ham S  -EM      Sets 2  3  -EM      Time 2  30\"  -EM         Exercise 3    Exercise Name 3  Incline Calf S  -EM      Sets 3  3  -EM      Time 3  30\"'  -EM         Exercise 4    Exercise Name 4  Cybex LP  -EM      Sets 4  1  -EM      Reps 4  30  -EM      Additional Comments  125#  -EM         Exercise 5    Exercise Name 5  NMES everters  -EM      Time 5  10'  -EM         Exercise 6    Exercise Name 6  Cybex Hip Abd  -EM      Sets 6  1  -EM      Reps 6  30  -EM      Additional Comments  60#  -EM         Exercise 7    Exercise Name 7  B 3 Way Multi-Hip  -EM      Sets 7  1  -EM      Reps 7  30  -EM      Additional Comments  2 Plate  -EM         Exercise 8    Exercise Name 8  Cybex Hip Add  -EM      Sets 8  1  -EM      Reps 8  30  -EM      Additional Comments  70#  -EM         Exercise 9    Exercise Name 9  Bridges  -EM      Sets 9  1  -EM      Reps 9  30  -EM         Exercise 10    Exercise Name 10  B St Hip Hikes  -EM      Sets 10  1  -EM      Reps 10  30  -EM         Exercise 11    Exercise Name 11  B St Ham Curls  -EM      Sets 11  3  -EM      Time 11  30\"  -EM        User Key  (r) = Recorded By, (t) = Taken By, (c) = Cosigned By    Initials Name " Provider Type    EM Jairo Mcintyre PTA Physical Therapy Assistant                       PT OP Goals     Row Name 05/10/21 1000          PT Short Term Goals    STG Date to Achieve  05/06/21  -EM     STG 1  Pt indepe with HEP for lumbar stabilization and lumbar mobility  -EM     STG 1 Progress  Ongoing  -EM     STG 2  Improve L ankle DF MMT to 3/5  -EM     STG 2 Progress  Ongoing;Progressing  -EM     STG 3  Improve light touch sensation by 25-50% with B feet  -EM     STG 3 Progress  Partially Met met with L foot only  -EM     STG 4  Able to ambulate fair erect (neutral lumbar ext) with RW on level surfaces  -EM     STG 4 Progress  Ongoing;Progressing  -EM     STG 5  Improve L ankle PF MMT to 2+/5 (perform seated heel raise)  -EM     STG 5 Progress  Ongoing  -EM     STG 6  Improve L ankle inv/italia MMT to 3/5  -EM     STG 6 Progress  Ongoing  -EM        Long Term Goals    LTG Date to Achieve  05/20/21  -EM     LTG 1  Improve L hip ext/L knee flex MMT to 5/5  -EM     LTG 1 Progress  Partially Met  -EM        Time Calculation    PT Goal Re-Cert Due Date  05/13/21  -EM       User Key  (r) = Recorded By, (t) = Taken By, (c) = Cosigned By    Initials Name Provider Type    EM Jairo Mcintyre PTA Physical Therapy Assistant                         Time Calculation:   Start Time: 1019  Stop Time: 1125  Time Calculation (min): 66 min  Total Timed Code Minutes- PT: 66 minute(s)  Time Calculation- PT  Start Time: 1019  Stop Time: 1125  Time Calculation (min): 66 min  Total Timed Code Minutes- PT: 66 minute(s)  PT Goal Re-Cert Due Date: 05/13/21  Therapy Charges for Today     Code Description Service Date Service Provider Modifiers Qty    67480010686 HC PT THER PROC EA 15 MIN 5/10/2021 Jairo Mcintyre, PTA GP 3    23877643784 HC PT NEUROMUSC RE EDUCATION EA 15 MIN 5/10/2021 Jairo Mcintyre, JASWANT GP 1                    Jairo Mcintyre PTA  5/10/2021

## 2021-05-12 ENCOUNTER — HOSPITAL ENCOUNTER (OUTPATIENT)
Dept: PHYSICAL THERAPY | Facility: HOSPITAL | Age: 71
Setting detail: THERAPIES SERIES
Discharge: HOME | End: 2021-05-12

## 2021-05-12 DIAGNOSIS — M54.9 CHRONIC BACK PAIN GREATER THAN 3 MONTHS DURATION: Primary | ICD-10-CM

## 2021-05-12 DIAGNOSIS — G89.29 CHRONIC BACK PAIN GREATER THAN 3 MONTHS DURATION: Primary | ICD-10-CM

## 2021-05-12 PROCEDURE — 97110 THERAPEUTIC EXERCISES: CPT

## 2021-05-12 NOTE — THERAPY DISCHARGE NOTE
Outpatient Physical Therapy Ortho Treatment Note/Discharge Summary  [facility]     Patient Name: Wayne Ayala  : 1950  MRN: [id number]  Today's Date: 2021      Visit Date: 2021   Attendance:   Subjective improvement: 50-55%  Recert: 21  MD Appointment: TBD      Visit Dx:    ICD-10-CM ICD-9-CM   1. Chronic back pain greater than 3 months duration  M54.9 724.5    G89.29 338.29       There is no problem list on file for this patient.       Past Medical History:   Diagnosis Date   • Arthritis    • Gout    • Hypertension         Past Surgical History:   Procedure Laterality Date   • COLONOSCOPY     • LUMBAR LAMINECTOMY Bilateral        PT Ortho     Row Name 21 09       Subjective Pain    Post-Treatment Pain Level  2  -EM       Posture/Observations    Posture/Observations Comments  Pt amb with SC with B knees slightly flexed, decreased lumbar loridosis, and decreased heelstrike slight trendelenburg gt pattern. Pt lacks knee flexion during gt due to knee pain from gout.   -EM       MMT Left Lower Ext    Lt Hip Flexion MMT, Gross Movement  (4/5) good  -EM    Lt Hip Extension MMT, Gross Movement  (3+/5) fair plus  -EM    Lt Ankle Plantarflexion MMT, Gross Movement  (2-/5) poor minus  -EM    Lt Ankle Dorsiflexion MMT, Gross Movement  (2-/5) poor minus  -EM      User Key  (r) = Recorded By, (t) = Taken By, (c) = Cosigned By    Initials Name Provider Type    EM Jairo Mcintyre, PTA Physical Therapy Assistant                      PT Assessment/Plan     Row Name 21 0900          PT Assessment    Assessment Comments  Pt has plateaued in function and progress at this time. Pt is Ind with HEP.  2/7 goals met.  -EM        PT Plan    PT Frequency  2x/week  -EM     Predicted Duration of Therapy Intervention (PT)  3-4 wks  -EM     PT Plan Comments  (S) Plan D/C to Ind HEP at thsis time due to plateau in progress at this time. May regain PT with time if pt begins to show  neurological improvements.   -EM       User Key  (r) = Recorded By, (t) = Taken By, (c) = Cosigned By    Initials Name Provider Type    EM Jairo Mcintyre, JASWANT Physical Therapy Assistant          Modalities     Row Name 05/12/21 0900             Subjective Comments    Subjective Comments  Pt reports no new changes since last tx.  Pt reports minimal improvements since attending PT.  -EM         Subjective Pain    Pre-Treatment Pain Level  2  -EM        User Key  (r) = Recorded By, (t) = Taken By, (c) = Cosigned By    Initials Name Provider Type    Jairo Henley, JASWANT Physical Therapy Assistant          OP Exercises     Row Name 05/12/21 0900             Subjective Comments    Subjective Comments  Pt reports no new changes since last tx.  Pt reports minimal improvements since attending PT.  -EM         Subjective Pain    Able to rate subjective pain?  yes  -EM      Pre-Treatment Pain Level  2  -EM      Post-Treatment Pain Level  2  -EM         Exercise 1    Exercise Name 1  PRO NK-Hiwhefn-8.0  -EM      Time 1  10'  -EM         Exercise 2    Exercise Name 2  B 3 Way Multi-Hip  -EM      Sets 2  1  -EM      Reps 2  20  -EM      Additional Comments  2 Plates  -EM         Exercise 3    Exercise Name 3  Cybex LP  -EM      Sets 3  1  -EM      Reps 3  30  -EM      Additional Comments  125#   -EM         Exercise 4    Exercise Name 4  Cybex Hip Abd  -EM      Sets 4  1  -EM      Reps 4  30  -EM      Additional Comments  65#  -EM         Exercise 5    Exercise Name 5  Cybex Hip Add  -EM      Sets 5  1  -EM      Reps 5  30  -EM      Additional Comments  75#  -EM        User Key  (r) = Recorded By, (t) = Taken By, (c) = Cosigned By    Initials Name Provider Type    Jairo Henley, JASWANT Physical Therapy Assistant                         PT OP Goals     Row Name 05/12/21 0900          PT Short Term Goals    STG Date to Achieve  05/06/21  -EM     STG 1  Pt indepe with HEP for lumbar stabilization and lumbar mobility  -EM     STG  1 Progress  (S) Met  -EM     STG 2  Improve L ankle DF MMT to 3/5  -EM     STG 2 Progress  Not Met  -EM     STG 3  Improve light touch sensation by 25-50% with B feet  -EM     STG 3 Progress  Partially Met met with L foot only  -EM     STG 4  Able to ambulate fair erect (neutral lumbar ext) with RW on level surfaces  -EM     STG 4 Progress  (S) Met  -EM     STG 5  Improve L ankle PF MMT to 2+/5 (perform seated heel raise)  -EM     STG 5 Progress  Not Met  -EM     STG 6  Improve L ankle inv/italia MMT to 3/5  -EM     STG 6 Progress  Not Met  -EM        Long Term Goals    LTG Date to Achieve  05/20/21  -EM     LTG 1  Improve L hip ext/L knee flex MMT to 5/5  -EM     LTG 1 Progress  Not Met  -EM        Time Calculation    PT Goal Re-Cert Due Date  05/13/21  -EM       User Key  (r) = Recorded By, (t) = Taken By, (c) = Cosigned By    Initials Name Provider Type    EM Jairo Mcintyre PTA Physical Therapy Assistant               Outcome Measure Options: Modifed Owestry  Modified Oswestry  Modified Oswestry Score/Comments: 10/50      Time Calculation:   Start Time: 0933  Stop Time: 1015  Time Calculation (min): 42 min  Total Timed Code Minutes- PT: 42 minute(s)  Therapy Charges for Today     Code Description Service Date Service Provider Modifiers Qty    69426927738 HC PT THER PROC EA 15 MIN 5/12/2021 Jairo Mcintyre PTA GP 3          PT G-Codes  Outcome Measure Options: Modifed Owestry  Modified Oswestry Score/Comments: 10/50            Jairo Mcintyre PTA  5/12/2021

## 2021-05-17 ENCOUNTER — APPOINTMENT (OUTPATIENT)
Dept: PHYSICAL THERAPY | Facility: HOSPITAL | Age: 71
End: 2021-05-17

## 2021-05-19 ENCOUNTER — APPOINTMENT (OUTPATIENT)
Dept: PHYSICAL THERAPY | Facility: HOSPITAL | Age: 71
End: 2021-05-19

## 2022-09-14 DIAGNOSIS — I73.9 PVD (PERIPHERAL VASCULAR DISEASE) WITH CLAUDICATION: Primary | ICD-10-CM

## 2022-09-27 ENCOUNTER — OFFICE VISIT (OUTPATIENT)
Dept: CARDIAC SURGERY | Facility: CLINIC | Age: 72
End: 2022-09-27

## 2022-09-27 VITALS
OXYGEN SATURATION: 98 % | HEART RATE: 90 BPM | WEIGHT: 236 LBS | SYSTOLIC BLOOD PRESSURE: 130 MMHG | HEIGHT: 71 IN | DIASTOLIC BLOOD PRESSURE: 82 MMHG | BODY MASS INDEX: 33.04 KG/M2

## 2022-09-27 DIAGNOSIS — E78.2 MIXED HYPERLIPIDEMIA: ICD-10-CM

## 2022-09-27 DIAGNOSIS — I73.9 PVD (PERIPHERAL VASCULAR DISEASE): Primary | ICD-10-CM

## 2022-09-27 PROCEDURE — 99203 OFFICE O/P NEW LOW 30 MIN: CPT | Performed by: NURSE PRACTITIONER

## 2022-09-27 RX ORDER — AMLODIPINE BESYLATE 10 MG/1
10 TABLET ORAL DAILY
COMMUNITY

## 2022-09-27 RX ORDER — EZETIMIBE 10 MG/1
10 TABLET ORAL DAILY
COMMUNITY

## 2022-09-27 RX ORDER — ALLOPURINOL 300 MG/1
300 TABLET ORAL DAILY
COMMUNITY

## 2022-09-27 NOTE — PATIENT INSTRUCTIONS
Normal arterial circulation to both lower extremities.     Recommend continued use of compression stockings 20-30mmHg daily, may remove at night.  Advised elevation of legs while at rest.  Encouraged daily exercise.      Recommend considering orthopedic lower lumbar causes for current neuropathy.

## 2022-09-28 PROBLEM — I10 ESSENTIAL HYPERTENSION: Status: ACTIVE | Noted: 2022-09-28

## 2022-09-28 PROBLEM — I73.9 PVD (PERIPHERAL VASCULAR DISEASE): Status: ACTIVE | Noted: 2022-09-28

## 2022-09-28 PROBLEM — M48.062 SPINAL STENOSIS OF LUMBAR REGION WITH NEUROGENIC CLAUDICATION: Status: ACTIVE | Noted: 2022-09-28

## 2022-09-28 PROBLEM — E78.2 MIXED HYPERLIPIDEMIA: Status: ACTIVE | Noted: 2022-09-28

## 2022-09-28 PROBLEM — M10.9 GOUT OF FOOT: Status: ACTIVE | Noted: 2022-09-28
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
